# Patient Record
Sex: FEMALE | Employment: UNEMPLOYED | URBAN - METROPOLITAN AREA
[De-identification: names, ages, dates, MRNs, and addresses within clinical notes are randomized per-mention and may not be internally consistent; named-entity substitution may affect disease eponyms.]

---

## 2017-02-27 ENCOUNTER — OFFICE VISIT (OUTPATIENT)
Dept: NEUROLOGY | Age: 67
End: 2017-02-27

## 2017-02-27 VITALS
DIASTOLIC BLOOD PRESSURE: 72 MMHG | SYSTOLIC BLOOD PRESSURE: 114 MMHG | BODY MASS INDEX: 36.86 KG/M2 | HEART RATE: 98 BPM | RESPIRATION RATE: 16 BRPM | HEIGHT: 63 IN | OXYGEN SATURATION: 97 % | WEIGHT: 208 LBS | TEMPERATURE: 98 F

## 2017-02-27 DIAGNOSIS — F41.9 ANXIETY: ICD-10-CM

## 2017-02-27 DIAGNOSIS — F02.80 LATE ONSET ALZHEIMER'S DISEASE WITHOUT BEHAVIORAL DISTURBANCE (HCC): Primary | ICD-10-CM

## 2017-02-27 DIAGNOSIS — R41.840 ATTENTION DEFICIT: ICD-10-CM

## 2017-02-27 DIAGNOSIS — G30.1 LATE ONSET ALZHEIMER'S DISEASE WITHOUT BEHAVIORAL DISTURBANCE (HCC): Primary | ICD-10-CM

## 2017-02-27 DIAGNOSIS — F32.A DEPRESSION, UNSPECIFIED DEPRESSION TYPE: ICD-10-CM

## 2017-02-27 RX ORDER — DONEPEZIL HYDROCHLORIDE 5 MG/1
5 TABLET, FILM COATED ORAL DAILY
Qty: 30 TAB | Refills: 0 | Status: SHIPPED | OUTPATIENT
Start: 2017-02-27 | End: 2017-04-27 | Stop reason: ALTCHOICE

## 2017-02-27 RX ORDER — DONEPEZIL HYDROCHLORIDE 10 MG/1
10 TABLET, FILM COATED ORAL DAILY
Qty: 30 TAB | Refills: 11 | Status: SHIPPED | OUTPATIENT
Start: 2017-03-27 | End: 2018-01-02 | Stop reason: CLARIF

## 2017-02-27 NOTE — PROGRESS NOTES
[address] [facility] [address]   [place] [address]   [place], [facility] [facility] [address]    [place]   [phone] Fax               Chief Complaint   Patient presents with    Results     follow up     Current Outpatient Prescriptions   Medication Sig Dispense Refill    JANUVIA 100 mg tablet       co-enzyme Q-10 (CO Q-10) 100 mg capsule Take 200 mg by mouth daily.  DOCOSAHEXANOIC ACID/EPA (FISH OIL PO) Take 1,200 mg by mouth.  simvastatin (ZOCOR) 20 mg tablet Take  by mouth nightly.  cholecalciferol (VITAMIN D3) 1,000 unit tablet Take  by mouth daily.  NITROFURANTOIN by Does Not Apply route as needed.  CAFFEINE/PRAST, DHEA,/B6/ (PRO-ESTRO D PO) Take 75 mg by mouth.  metformin (GLUCOPHAGE) 500 mg tablet Take 500 mg by mouth two (2) times daily (with meals).  solifenacin (VESICARE) 5 mg tablet Take 5 mg by mouth daily.  multivitamin, stress formula (STRESS TAB) tablet Take 1 Tab by mouth daily.  PROGESTERONE 50 mg by Intravaginally route daily.  rosuvastatin (CRESTOR) 5 mg tablet Take 10 mg by mouth nightly. No Known Allergies  Social History   Substance Use Topics    Smoking status: Never Smoker    Smokeless tobacco: None    Alcohol use No     Mrs. Riki Ellington returns today for follow-up after her initial consultation for complaints of memory loss. MRI of the brain demonstrates age-related changes. 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 EEG, carotid Doppler, and thyroid/B12 all unrevealing. She had a formal neuropsychological evaluation and per Dr. Precious Villar report: \"This patient generated an abnormal range Neuropsychological Evaluation with respect to neurocognitive functioning.  In this regard, impairments are noted for verbal fluency, confrontation naming, sustained visual attention, verbal and nonverbal auditory attention, auditory memory, visual recognition recall, working memory capacity, and bilateral fine motor dexterity. At the same time, her verbal comprehension, perceptual reasoning, verbal comprehension, bilateral motor speed, and executive functioning remain normal. Emotionally, there is support for moderate depression and anxiety and she reports symptoms consistent with a PTSD diagnosis as well.      The pattern of normal versus abnormal neurocognitive test scores suggests that this is not a pseudodementia nor is the profile consistent with a chronic attention issue. Concern is thus for a mild to moderate form of dementia exacerbated by emotional distress. I suggest consideration for memory management medication, medication for attention, and psychiatric treatment and counseling for depression and anxiety concerns. While she is competent, I am concerned about the impact marked attention problems may have upon her driving. In that regard, I suggest consideration for a formal evaluation of driving safety. She also needs supervision for medications and finances. I would expect to see some improvement in day-to-day neurocognitive functioning pending successful mental health intervention. Underlying dementia issues will likely persist, however. This issue has been caught relatively early on, and I hope she recognizes this as positive. Baseline now established. Follow up prn. Clinical correlation is, of course, indicated. \"    Examination  Visit Vitals    Resp 16    Ht 5' 3\" (1.6 m)    Wt 94.3 kg (208 lb)    BMI 36.85 kg/m2     She looks well. She has appropriate dress and grooming. Awake alert oriented and conversant. Normal speech and language. Steady gait. Impression/Plan    Dementia, likely Alzheimer's type, with superimposed depression/anxiety/attention deficit. We discussed today her test results at length including her neuropsychological evaluation and what that means.   We discussed dementia, the treatment of such, natural history, progression, goals of therapy, medications used and there mechanism of action as well as the rationale for treatment, the rationale for dual therapy, and nonpharmacologic issues such as staying active cognitively, physically, and socially. We also discussed how depression and anxiety as well as attention if untreated can exacerbate memory complaints. At this point we will start Aricept in a customary fashion titrating to 10 mg daily. She will return in 8 weeks and we will start Namzaric. After her memory modifying medication has been in place then we will have her either see Dr. Robbie Bazan for optimization of her depression/anxiety versus psychiatry. Answered all of hers and her 's questions today. We will also send her information to our  to see if she would qualify for the ideas study regarding imaging for Alzheimer's disease. This note was created using voice recognition software. Despite editing, there may be syntax errors. This note will not be viewable in [address]       Total time: 35 min   Counseling / coordination time: 30 min   > 50% counseling / coordination?: Yes re: as documented above

## 2017-02-27 NOTE — PATIENT INSTRUCTIONS
Information Regarding Testing     If you have physican order for a test or a medication denied by your insurance company, this does not mean the test or medication is not appropriate for you as that is a medical decision, not a decision to be made by an insurance company representative or by an [facility] physician who has not interviewed and examined you. This is a decision to be made between you and your physician. The denial of services is a contractual matter between you and your insurance company, not an issue between your physician and the insurance company. If your test or medication is denied, you can take the following steps to help resolve the issue:    1. File a complaint with the [facility] regarding your insurance company's denial of services ordered for you. You can do this either by calling them directly or by completing an on-line complaint form on the [employer]. This can be found at www.Yub    2. Also file a formal complaint with your insurance company and ask to have the name of the person denying the service so that you may explore a legal option should you be harmed by this denial of service. Again, the fact the insurance company will not pay for the service does not mean it is not medically necessary and I would encourage you to follow through with the plan that was made with your physician    3. File a written complaint with your employer so your employer and benefit manager is aware of the poor coverage they are providing their employees. If you have medicare/medicaid, complain to your representative in the House and to your Roxana Gardiner.         [facility]   Statement to Patients  April 1, 2014      In an effort to ensure the large volume of patient prescription refills is processed in the most efficient and expeditious manner, we are asking our patients to assist us by calling your Pharmacy for all prescription refills, this will include also your  Mail Order Pharmacy. The pharmacy will contact our office electronically to continue the refill process. Please do not wait until the last minute to call your pharmacy. We need at least 48 hours (2days) to fill prescriptions. We also encourage you to call your pharmacy before going to  your prescription to make sure it is ready. With regard to controlled substance prescription refill requests (narcotic refills) that need to be picked up at our office, we ask your cooperation by providing us with at least 72 hours (3days) notice that you will need a refill. We will not refill narcotic prescription refill requests after 4:00pm on any weekday, Monday through Thursday, or after 2:00pm on Fridays, or on the weekends. We encourage everyone to explore another way of getting your prescription refill request processed using [employer], our patient web portal through our electronic medical record system. [employer] is an efficient and effective way to communicate your medication request directly to the office and  downloadable as an valerie on your smart phone . [employer] also features a review functionality that allows you to view your medication list as well as leave messages for your physician. Are you ready to get connected? If so please review the attatched instructions or speak to any of our staff to get you set up right away! Thank you so much for your cooperation. Should you have any questions please contact our Practice Administrator. The Physicians and Staff,  [facility]       If we have ordered testing for you, we do not call patients with results and we do not give test results over the phone. We schedule follow up appointments so that your results can be discussed in person and any questions you have regarding them may be addressed.   If something of concern is revealed on your test, we will call you for a sooner follow up appointment. Additionally, results may be found by using the My Chart feature and one of our patient service representatives at the  can give you instructions on how to access this feature of our electronic medical record system. Learning About [employer]  What is a living will? A living will is a legal form you use to write down the kind of care you want at the end of your life. It is used by the health professionals who will treat you if you aren't able to decide for yourself. If you put your wishes in writing, your loved ones and others will know what kind of care you want. They won't need to guess. This can ease your mind and be helpful to others. A living will is not the same as an estate or property will. An estate will explains what you want to happen with your money and property after you die. Is a living will a legal document? A living will is a legal document. Each state has its own laws about living qureshi. If you move to another state, make sure that your living will is legal in the state where you now live. Or you might use a universal form that has been approved by many states. This kind of form can sometimes be completed and stored online. Your electronic copy will then be available wherever you have a connection to the Internet. In most cases, doctors will respect your wishes even if you have a form from a different state. · You don't need an  to complete a living will. But legal advice can be helpful if your state's laws are unclear, your health history is complicated, or your family can't agree on what should be in your living will. · You can change your living will at any time. Some people find that their wishes about end-of-life care change as their health changes. · In addition to making a living will, think about completing a medical power of  form.  This form lets you name the person you want to make end-of-life treatment decisions for you (your \"health care agent\") if you're not able to. Many hospitals and nursing homes will give you the forms you need to complete a living will and a medical power of . · Your living will is used only if you can't make or communicate decisions for yourself anymore. If you become able to make decisions again, you can accept or refuse any treatment, no matter what you wrote in your living will. · Your state may offer an online registry. This is a place where you can store your living will online so the doctors and nurses who need to treat you can find it right away. What should you think about when creating a living will? Talk about your end-of-life wishes with your family members and your doctor. Let them know what you want. That way the people making decisions for you won't be surprised by your choices. Think about these questions as you make your living will:  · Do you know enough about life support methods that might be used? If not, talk to your doctor so you know what might be done if you can't breathe on your own, your heart stops, or you're unable to swallow. · What things would you still want to be able to do after you receive life-support methods? Would you want to be able to walk? To speak? To eat on your own? To live without the help of machines? · If you have a choice, where do you want to be cared for? In your home? At a hospital or nursing home? · Do you want certain Yarsani practices performed if you become very ill? · If you have a choice at the end of your life, where would you prefer to die? At home? In a hospital or nursing home? Somewhere else? · Would you prefer to be buried or cremated? · Do you want your organs to be donated after you die? What should you do with your living will? · Make sure that your family members and your health care agent have copies of your living will. · Give your doctor a copy of your living will to keep in your medical record.  If you have more than one doctor, make sure that each one has a copy. · You may want to put a copy of your living will where it can be easily found. Where can you learn more? Go to http://julioc-fer.info/. Enter V766 in the search box to learn more about \"Learning About Living Perroy. \"  Current as of: February 24, 2016  Content Version: 11.1  © 3297-0424 [employer]. Care instructions adapted under license by [employer] (which disclaims liability or warranty for this information). If you have questions about a medical condition or this instruction, always ask your healthcare professional. [address] any warranty or liability for your use of this information. Start Aricept 5 mg once daily for 30 days and then start Aricept 10 mg once daily thereafter  If you become a little queasy with this medicine try taking it with food. If the food does not help try taking it at night so you sleep through it.   If this happens it is transient and should get better    Return in about 8 weeks and we will start Namzaric titration

## 2017-02-27 NOTE — MR AVS SNAPSHOT
Visit Information Date & Time Provider Department Dept. Phone Encounter #  
 2/27/2017 10:40 AM Lars Cowden, MD Neurology [phone] Follow-up Instructions Return in about 8 weeks (around 4/24/2017). Upcoming Health Maintenance Date Due Hepatitis C Screening 1950 DTaP/Tdap/Td series (1 - Tdap) 8/7/1973 PAP AKA CERVICAL CYTOLOGY 8/7/1973 BREAST CANCER SCRN MAMMOGRAM 8/7/2002 FOBT Q 1 YEAR AGE 50-75 8/7/2002 ZOSTER VACCINE AGE 60> 8/7/2012 INFLUENZA AGE 9 TO ADULT 8/1/2016 Allergies as of 2/27/2017  Review Complete On: 2/27/2017 By: Lars Cowden, MD  
 No Known Allergies Current Immunizations  Never Reviewed No immunizations on file. Not reviewed this visit You Were Diagnosed With   
  
 Codes Comments Late onset Alzheimer's disease without behavioral disturbance    -  Primary ICD-10-CM: G30.1, F02.80 ICD-9-CM: 331.0, 294.10 Attention deficit     ICD-10-CM: R41.840 ICD-9-CM: 799.51 Depression, unspecified depression type     ICD-10-CM: F32.9 ICD-9-CM: 977 Anxiety     ICD-10-CM: F41.9 ICD-9-CM: 300.00 Vitals BP  
  
  
  
  
  
 114/72 Vitals History BMI and BSA Data Body Mass Index Body Surface Area  
 36.85 kg/m 2 2.05 m 2 Preferred Pharmacy Pharmacy Name Phone [facility] [address] , [address]  [phone] Your Updated Medication List  
  
   
This list is accurate as of: 2/27/17 11:14 AM.  Always use your most recent med list.  
  
  
  
  
 CO Q-10 100 mg capsule Generic drug:  co-enzyme Q-10 Take 200 mg by mouth daily. CRESTOR 5 mg tablet Generic drug:  rosuvastatin Take 10 mg by mouth nightly. * donepezil 5 mg tablet Commonly known as:  ARICEPT Take 1 Tab by mouth daily. * donepezil 10 mg tablet Commonly known as:  ARICEPT Take 1 Tab by mouth daily. Start taking on:  3/27/2017 FISH OIL PO Take 1,200 mg by mouth. JANUVIA 100 mg tablet Generic drug:  SITagliptin  
  
 metFORMIN 500 mg tablet Commonly known as:  GLUCOPHAGE Take 500 mg by mouth two (2) times daily (with meals). multivitamin, stress formula tablet Commonly known as:  STRESS TAB Take 1 Tab by mouth daily. NITROFURANTOIN  
by Does Not Apply route as needed. PRO-ESTRO D PO Take 75 mg by mouth. PROGESTERONE  
50 mg by Intravaginally route daily. simvastatin 20 mg tablet Commonly known as:  ZOCOR Take  by mouth nightly. VESIcare 5 mg tablet Generic drug:  solifenacin Take 5 mg by mouth daily. VITAMIN D3 1,000 unit tablet Generic drug:  cholecalciferol Take  by mouth daily. * Notice: This list has 2 medication(s) that are the same as other medications prescribed for you. Read the directions carefully, and ask your doctor or other care provider to review them with you. Prescriptions Sent to Pharmacy Refills  
 donepezil (ARICEPT) 5 mg tablet 0 Sig: Take 1 Tab by mouth daily. Class: Normal  
 Pharmacy: [facility], [address] Ph #: [phone] Route: Oral  
 donepezil (ARICEPT) 10 mg tablet 11 Starting on: 3/27/2017 Sig: Take 1 Tab by mouth daily. Class: Normal  
 Pharmacy: [facility], [address] Ph #: [phone] Route: Oral  
  
We Performed the Following REFERRAL TO NEUROLOGY [WYL74 Custom] Comments:  
 Please evaluate patient for SEND TO [facility] FOR IDEAS STUDY Follow-up Instructions Return in about 8 weeks (around 4/24/2017). Referral Information Referral ID Referred By Referred To [id number] [address] MD Ryland Cobb [address] [facility], [address] [address] Phone: [phone] Fax: [phone] Visits Status Start Date End Date 1 New Request 2/27/17 2/27/18 If your referral has a status of pending review or denied, additional information will be sent to support the outcome of this decision. Patient Instructions Information Regarding Testing If you have physican order for a test or a medication denied by your insurance company, this does not mean the test or medication is not appropriate for you as that is a medical decision, not a decision to be made by an insurance company representative or by an [facility] physician who has not interviewed and examined you. This is a decision to be made between you and your physician. The denial of services is a contractual matter between you and your insurance company, not an issue between your physician and the insurance company. If your test or medication is denied, you can take the following steps to help resolve the issue: 1. File a complaint with the [facility] of Insurance regarding your insurance company's denial of services ordered for you. You can do this either by calling them directly or by completing an on-line complaint form on the [employer]. This can be found at www.[place].Digital Reef 2. Also file a formal complaint with your insurance company and ask to have the name of the person denying the service so that you may explore a legal option should you be harmed by this denial of service. Again, the fact the insurance company will not pay for the service does not mean it is not medically necessary and I would encourage you to follow through with the plan that was made with your physician 3. File a written complaint with your employer so your employer and benefit manager is aware of the poor coverage they are providing their employees. If you have medicare/medicaid, complain to your representative in the House and to your Roxana Gardiner. PRESCRIPTION REFILL POLICY [facility] Statement to Patients April 1, 2014 In an effort to ensure the large volume of patient prescription refills is processed in the most efficient and expeditious manner, we are asking our patients to assist us by calling your Pharmacy for all prescription refills, this will include also your  Mail Order Pharmacy. The pharmacy will contact our office electronically to continue the refill process. Please do not wait until the last minute to call your pharmacy. We need at least 48 hours (2days) to fill prescriptions. We also encourage you to call your pharmacy before going to  your prescription to make sure it is ready. With regard to controlled substance prescription refill requests (narcotic refills) that need to be picked up at our office, we ask your cooperation by providing us with at least 72 hours (3days) notice that you will need a refill. We will not refill narcotic prescription refill requests after 4:00pm on any weekday, Monday through Thursday, or after 2:00pm on Fridays, or on the weekends. We encourage everyone to explore another way of getting your prescription refill request processed using [employer], our patient web portal through our electronic medical record system. IIIMOBI is an efficient and effective way to communicate your medication request directly to the office and  downloadable as an valerie on your smart phone . [employer] also features a review functionality that allows you to view your medication list as well as leave messages for your physician. Are you ready to get connected? If so please review the attatched instructions or speak to any of our staff to get you set up right away! Thank you so much for your cooperation. Should you have any questions please contact our Practice Administrator. The Physicians and Staff,  [facility] If we have ordered testing for you, we do not call patients with results and we do not give test results over the phone. We schedule follow up appointments so that your results can be discussed in person and any questions you have regarding them may be addressed. If something of concern is revealed on your test, we will call you for a sooner follow up appointment. Additionally, results may be found by using the My Chart feature and one of our patient service representatives at the  can give you instructions on how to access this feature of our electronic medical record system. [address] What is a living will? A living will is a legal form you use to write down the kind of care you want at the end of your life. It is used by the health professionals who will treat you if you aren't able to decide for yourself. If you put your wishes in writing, your loved ones and others will know what kind of care you want. They won't need to guess. This can ease your mind and be helpful to others. A living will is not the same as an estate or property will. An estate will explains what you want to happen with your money and property after you die. Is a living will a legal document? A living will is a legal document. Each state has its own laws about living qureshi. If you move to another state, make sure that your living will is legal in the state where you now live. Or you might use a universal form that has been approved by many states. This kind of form can sometimes be completed and stored online. Your electronic copy will then be available wherever you have a connection to the Internet. In most cases, doctors will respect your wishes even if you have a form from a different state. · You don't need an  to complete a living will.  But legal advice can be helpful if your state's laws are unclear, your health history is complicated, or your family can't agree on what should be in your living will. · You can change your living will at any time. Some people find that their wishes about end-of-life care change as their health changes. · In addition to making a living will, think about completing a medical power of  form. This form lets you name the person you want to make end-of-life treatment decisions for you (your \"health care agent\") if you're not able to. Many hospitals and nursing homes will give you the forms you need to complete a living will and a medical power of . · Your living will is used only if you can't make or communicate decisions for yourself anymore. If you become able to make decisions again, you can accept or refuse any treatment, no matter what you wrote in your living will. · Your state may offer an online registry. This is a place where you can store your living will online so the doctors and nurses who need to treat you can find it right away. What should you think about when creating a living will? Talk about your end-of-life wishes with your family members and your doctor. Let them know what you want. That way the people making decisions for you won't be surprised by your choices. Think about these questions as you make your living will: · Do you know enough about life support methods that might be used? If not, talk to your doctor so you know what might be done if you can't breathe on your own, your heart stops, or you're unable to swallow. · What things would you still want to be able to do after you receive life-support methods? Would you want to be able to walk? To speak? To eat on your own? To live without the help of machines? · If you have a choice, where do you want to be cared for? In your home? At a hospital or nursing home? · Do you want certain Rastafarian practices performed if you become very ill? · If you have a choice at the end of your life, where would you prefer to die? At home? In a hospital or nursing home? Somewhere else? · Would you prefer to be buried or cremated? · Do you want your organs to be donated after you die? What should you do with your living will? · Make sure that your family members and your health care agent have copies of your living will. · Give your doctor a copy of your living will to keep in your medical record. If you have more than one doctor, make sure that each one has a copy. · You may want to put a copy of your living will where it can be easily found. Where can you learn more? Go to http://julio c-fer.info/. Enter D370 in the search box to learn more about \"Learning About Living Perroy. \" Current as of: February 24, 2016 Content Version: 11.1 © 4884-7433 codesy. Care instructions adapted under license by [employer] (which disclaims liability or warranty for this information). If you have questions about a medical condition or this instruction, always ask your healthcare professional. [address] any warranty or liability for your use of this information. Start Aricept 5 mg once daily for 30 days and then start Aricept 10 mg once daily thereafter If you become a little queasy with this medicine try taking it with food. If the food does not help try taking it at night so you sleep through it. If this happens it is transient and should get better Return in about 8 weeks and we will start Namzaric titration Introducing [facility]! Radha Steel introduces [employer] patient portal. Now you can access parts of your medical record, email your doctor's office, and request medication refills online. 1. In your internet browser, go to [email or web address] 2. Click on the First Time User? Click Here link in the Sign In box.  You will see the New Member Sign Up page. 3. Enter your [employer] Access Code exactly as it appears below. You will not need to use this code after youve completed the sign-up process. If you do not sign up before the expiration date, you must request a new code. · [employer] Access Code: [email or web address] Expires: 3/22/2017  9:48 AM 
 
4. Enter the last four digits of your Social Security Number (xxxx) and Date of Birth (mm/dd/yyyy) as indicated and click Submit. You will be taken to the next sign-up page. 5. Create a [employer] ID. This will be your [employer] login ID and cannot be changed, so think of one that is secure and easy to remember. 6. Create a [employer] password. You can change your password at any time. 7. Enter your Password Reset Question and Answer. This can be used at a later time if you forget your password. 8. Enter your e-mail address. You will receive e-mail notification when new information is available in [address] 9. Click Sign Up. You can now view and download portions of your medical record. 10. Click the Download Summary menu link to download a portable copy of your medical information. If you have questions, please visit the Frequently Asked Questions section of the [employer] website. Remember, [employer] is NOT to be used for urgent needs. For medical emergencies, dial 911. Now available from your iPhone and Android! Please provide this summary of care documentation to your next provider. Your primary care clinician is listed as Brent Crowell. If you have any questions after today's visit, please call [phone].

## 2017-02-27 NOTE — PROGRESS NOTES
Follow up for results for memory loss. No significant changes in memory since last visit. No acute problems reported.

## 2017-03-09 ENCOUNTER — TELEPHONE (OUTPATIENT)
Dept: NEUROLOGY | Age: 67
End: 2017-03-09

## 2017-03-09 NOTE — TELEPHONE ENCOUNTER
Called and spoke to patient   She last saw dr Miranda Clay and was started on aricept she has been having loose stools and would like to know if she should stop the medication/ start a new medication?   Please advise

## 2017-03-09 NOTE — TELEPHONE ENCOUNTER
----- Message from Madison Adorno sent at 3/9/2017  4:07 PM EST -----  Regarding:  CYNDEE Ho/Telephone    Pt is waiting and requesting the \"Aricept\" Rx. It is causing severe diarrhea. Pt's best contact number [phone].

## 2017-04-27 ENCOUNTER — OFFICE VISIT (OUTPATIENT)
Dept: NEUROLOGY | Age: 67
End: 2017-04-27

## 2017-04-27 VITALS
BODY MASS INDEX: 36.86 KG/M2 | RESPIRATION RATE: 20 BRPM | WEIGHT: 208 LBS | DIASTOLIC BLOOD PRESSURE: 70 MMHG | HEIGHT: 63 IN | SYSTOLIC BLOOD PRESSURE: 110 MMHG

## 2017-04-27 DIAGNOSIS — F02.80 LATE ONSET ALZHEIMER'S DISEASE WITHOUT BEHAVIORAL DISTURBANCE (HCC): Primary | ICD-10-CM

## 2017-04-27 DIAGNOSIS — G30.1 LATE ONSET ALZHEIMER'S DISEASE WITHOUT BEHAVIORAL DISTURBANCE (HCC): Primary | ICD-10-CM

## 2017-04-27 RX ORDER — ALPRAZOLAM 0.25 MG/1
TABLET ORAL
COMMUNITY
End: 2017-10-10

## 2017-04-27 RX ORDER — NAPROXEN 500 MG/1
500 TABLET ORAL 2 TIMES DAILY WITH MEALS
COMMUNITY
End: 2018-07-03

## 2017-04-27 RX ORDER — GABAPENTIN 300 MG/1
300 CAPSULE ORAL 3 TIMES DAILY
COMMUNITY
Start: 2017-03-27

## 2017-04-27 RX ORDER — LEVOTHYROXINE SODIUM 75 UG/1
TABLET ORAL
COMMUNITY
Start: 2017-04-22

## 2017-04-27 RX ORDER — TRIAMCINOLONE ACETONIDE 55 UG/1
2 SPRAY, METERED NASAL DAILY
COMMUNITY

## 2017-04-27 NOTE — MR AVS SNAPSHOT
Visit Information Date & Time Provider Department Dept. Phone Encounter #  
 4/27/2017 10:30 AM Claudetta Cord, NP [facility] [phone] [id number] Follow-up Instructions Return in about 2 months (around 6/27/2017). Upcoming Health Maintenance Date Due Hepatitis C Screening 1950 DTaP/Tdap/Td series (1 - Tdap) 8/7/1973 PAP AKA CERVICAL CYTOLOGY 8/7/1973 BREAST CANCER SCRN MAMMOGRAM 8/7/2002 FOBT Q 1 YEAR AGE 50-75 8/7/2002 ZOSTER VACCINE AGE 60> 8/7/2012 INFLUENZA AGE 9 TO ADULT 8/1/2016 Allergies as of 4/27/2017  Review Complete On: 2/27/2017 By: Hussain Cool MD  
 No Known Allergies Current Immunizations  Never Reviewed No immunizations on file. Not reviewed this visit You Were Diagnosed With   
  
 Codes Comments Late onset Alzheimer's disease without behavioral disturbance    -  Primary ICD-10-CM: G30.1, F02.80 ICD-9-CM: 331.0, 294.10 Vitals BP Resp Height(growth percentile) Weight(growth percentile) BMI OB Status 110/70 20 5' 3\" (1.6 m) 208 lb (94.3 kg) 36.85 kg/m2 Hysterectomy Smoking Status Never Smoker BMI and BSA Data Body Mass Index Body Surface Area  
 36.85 kg/m 2 2.05 m 2 Preferred Pharmacy Pharmacy Name Phone [facility] [address], [address]  [phone] Your Updated Medication List  
  
   
This list is accurate as of: 4/27/17 11:10 AM.  Always use your most recent med list.  
  
  
  
  
 ALPRAZolam 0.25 mg tablet Commonly known as:  Earl Poke Take  by mouth. CO Q-10 100 mg capsule Generic drug:  co-enzyme Q-10 Take 200 mg by mouth daily. donepezil 10 mg tablet Commonly known as:  ARICEPT Take 1 Tab by mouth daily. FISH OIL PO Take 1,200 mg by mouth.  
  
 gabapentin 300 mg capsule Commonly known as:  NEURONTIN Take 300 mg by mouth nightly. JANUVIA 100 mg tablet Generic drug:  SITagliptin  
  
 memantine-donepezil 28-10 mg Cspx Commonly known as:  [employer] AND [facility] Take 1 Cap by mouth daily. NAPROSYN 500 mg tablet Generic drug:  naproxen Take 500 mg by mouth two (2) times daily (with meals). NASACORT AQ 55 mcg nasal inhaler Generic drug:  triamcinolone 2 Sprays daily. PRO-ESTRO D PO Take 75 mg by mouth. SYNTHROID 75 mcg tablet Generic drug:  levothyroxine VITAMIN D3 1,000 unit tablet Generic drug:  cholecalciferol Take  by mouth daily. Prescriptions Sent to Pharmacy Refills  
 memantine-donepezil (NAMZARIC) 28-10 mg CSpX 5 Sig: Take 1 Cap by mouth daily. Class: Normal  
 Pharmacy: [facility] [address] [facility] , [address] [facility] #: [phone] Route: Oral  
  
Follow-up Instructions Return in about 2 months (around 6/27/2017). Patient Instructions PRESCRIPTION REFILL POLICY [facility] Statement to Patients April 1, 2014 In an effort to ensure the large volume of patient prescription refills is processed in the most efficient and expeditious manner, we are asking our patients to assist us by calling your Pharmacy for all prescription refills, this will include also your  Mail Order Pharmacy. The pharmacy will contact our office electronically to continue the refill process. Please do not wait until the last minute to call your pharmacy. We need at least 48 hours (2days) to fill prescriptions. We also encourage you to call your pharmacy before going to  your prescription to make sure it is ready. With regard to controlled substance prescription refill requests (narcotic refills) that need to be picked up at our office, we ask your cooperation by providing us with at least 72 hours (3days) notice that you will need a refill.  
 
We will not refill narcotic prescription refill requests after 4:00pm on any weekday, Monday through Thursday, or after 2:00pm on Fridays, or on the weekends. We encourage everyone to explore another way of getting your prescription refill request processed using [employer] our patient web portal through our electronic medical record system. [place] is an efficient and effective way to communicate your medication request directly to the office and  downloadable as an valerie on your smart phone . [employer] also features a review functionality that allows you to view your medication list as well as leave messages for your physician. Are you ready to get connected? If so please review the attatched instructions or speak to any of our staff to get you set up right away! Thank you so much for your cooperation. Should you have any questions please contact our Practice Administrator. The Physicians and Staff,  [facility] [address] What is a living will? A living will is a legal form you use to write down the kind of care you want at the end of your life. It is used by the health professionals who will treat you if you aren't able to decide for yourself. If you put your wishes in writing, your loved ones and others will know what kind of care you want. They won't need to guess. This can ease your mind and be helpful to others. A living will is not the same as an estate or property will. An estate will explains what you want to happen with your money and property after you die. Is a living will a legal document? A living will is a legal document. Each state has its own laws about living qureshi. If you move to another state, make sure that your living will is legal in the state where you now live. Or you might use a universal form that has been approved by many states. This kind of form can sometimes be completed and stored online. Your electronic copy will then be available wherever you have a connection to the Internet.  In most cases, doctors will respect your wishes even if you have a form from a different state. · You don't need an  to complete a living will. But legal advice can be helpful if your state's laws are unclear, your health history is complicated, or your family can't agree on what should be in your living will. · You can change your living will at any time. Some people find that their wishes about end-of-life care change as their health changes. · In addition to making a living will, think about completing a medical power of  form. This form lets you name the person you want to make end-of-life treatment decisions for you (your \"health care agent\") if you're not able to. Many hospitals and nursing homes will give you the forms you need to complete a living will and a medical power of . · Your living will is used only if you can't make or communicate decisions for yourself anymore. If you become able to make decisions again, you can accept or refuse any treatment, no matter what you wrote in your living will. · Your state may offer an online registry. This is a place where you can store your living will online so the doctors and nurses who need to treat you can find it right away. What should you think about when creating a living will? Talk about your end-of-life wishes with your family members and your doctor. Let them know what you want. That way the people making decisions for you won't be surprised by your choices. Think about these questions as you make your living will: · Do you know enough about life support methods that might be used? If not, talk to your doctor so you know what might be done if you can't breathe on your own, your heart stops, or you're unable to swallow. · What things would you still want to be able to do after you receive life-support methods? Would you want to be able to walk? To speak? To eat on your own? To live without the help of machines? · If you have a choice, where do you want to be cared for? In your home? At a hospital or nursing home? · Do you want certain Nondenominational practices performed if you become very ill? · If you have a choice at the end of your life, where would you prefer to die? At home? In a hospital or nursing home? Somewhere else? · Would you prefer to be buried or cremated? · Do you want your organs to be donated after you die? What should you do with your living will? · Make sure that your family members and your health care agent have copies of your living will. · Give your doctor a copy of your living will to keep in your medical record. If you have more than one doctor, make sure that each one has a copy. · You may want to put a copy of your living will where it can be easily found. Where can you learn more? Go to http://julio c-fer.info/. Enter I903 in the search box to learn more about \"Learning About Living Perrosanjay. \" Current as of: February 24, 2016 Content Version: 11.2 © 1374-1595 Tablo. Care instructions adapted under license by [employer] (which disclaims liability or warranty for this information). If you have questions about a medical condition or this instruction, always ask your healthcare professional. [place] [address] any warranty or liability for your use of this information. Advance Directives: Care Instructions Your Care Instructions An advance directive is a legal way to state your wishes at the end of your life. It tells your family and your doctor what to do if you can no longer say what you want. There are two main types of advance directives. You can change them any time that your wishes change. · A living will tells your family and your doctor your wishes about life support and other treatment.  
· A durable power of  for health care lets you name a person to make treatment decisions for you when you can't speak for yourself. This person is called a health care agent. If you do not have an advance directive, decisions about your medical care may be made by a doctor or a  who doesn't know you. It may help to think of an advance directive as a gift to the people who care for you. If you have one, they won't have to make tough decisions by themselves. Follow-up care is a key part of your treatment and safety. Be sure to make and go to all appointments, and call your doctor if you are having problems. It's also a good idea to know your test results and keep a list of the medicines you take. How can you care for yourself at home? · Discuss your wishes with your loved ones and your doctor. This way, there are no surprises. · Many states have a unique form. Or you might use a universal form that has been approved by many states. This kind of form can sometimes be completed and stored online. Your electronic copy will then be available wherever you have a connection to the Internet. In most cases, doctors will respect your wishes even if you have a form from a different state. · You don't need a  to do an advance directive. But you may want to get legal advice. · Think about these questions when you prepare an advance directive: ¨ Who do you want to make decisions about your medical care if you are not able to? Many people choose a family member or close friend. ¨ Do you know enough about life support methods that might be used? If not, talk to your doctor so you understand. ¨ What are you most afraid of that might happen? You might be afraid of having pain, losing your independence, or being kept alive by machines. ¨ Where would you prefer to die? Choices include your home, a hospital, or a nursing home. ¨ Would you like to have information about hospice care to support you and your family? ¨ Do you want to donate organs when you die? ¨ Do you want certain Confucianism practices performed before you die? If so, put your wishes in the advance directive. · Read your advance directive every year, and make changes as needed. When should you call for help? Be sure to contact your doctor if you have any questions. Where can you learn more? Go to http://julio c-fer.info/. Enter R264 in the search box to learn more about \"Advance Directives: Care Instructions. \" Current as of: November 17, 2016 Content Version: 11.2 © 0749-3633 INAPPIN. Care instructions adapted under license by [employer] (which disclaims liability or warranty for this information). If you have questions about a medical condition or this instruction, always ask your healthcare professional. [address] any warranty or liability for your use of this information. Introducing [facility]! Geeta Leighann introduces [employer] patient portal. Now you can access parts of your medical record, email your doctor's office, and request medication refills online. 1. In your internet browser, go to [email or web address] 2. Click on the First Time User? Click Here link in the Sign In box. You will see the New Member Sign Up page. 3. Enter your [employer] Access Code exactly as it appears below. You will not need to use this code after youve completed the sign-up process. If you do not sign up before the expiration date, you must request a new code. · [employer] Access Code: [email or web address] Expires: 7/26/2017 11:09 AM 
 
4. Enter the last four digits of your Social Security Number (xxxx) and Date of Birth (mm/dd/yyyy) as indicated and click Submit. You will be taken to the next sign-up page. 5. Create a [employer] ID. This will be your [employer] login ID and cannot be changed, so think of one that is secure and easy to remember. 6. Create a [employer] password. You can change your password at any time. 7. Enter your Password Reset Question and Answer. This can be used at a later time if you forget your password. 8. Enter your e-mail address. You will receive e-mail notification when new information is available in [address] 9. Click Sign Up. You can now view and download portions of your medical record. 10. Click the Download Summary menu link to download a portable copy of your medical information. If you have questions, please visit the Frequently Asked Questions section of the [employer] website. Remember, [employer] is NOT to be used for urgent needs. For medical emergencies, dial 911. Now available from your iPhone and Android! Please provide this summary of care documentation to your next provider. Your primary care clinician is listed as Katia Hope. If you have any questions after today's visit, please call [phone].

## 2017-04-27 NOTE — PROGRESS NOTES
More Maldonado is a 61 y.o. female who presents with the following  Chief Complaint   Patient presents with    Memory Loss       HPI Patient comes in with  for a follow up for memory loss. Was diagnosed with dementia per Dr. Elham Hollingsworth. She was since started on Aricept 10 mg. Not noticed any changes in her memory. Still feels like things are going well. Driving, cooking, without any problems. She is staying busy by doing puzzles, reading, listening to country music. She is sleeping well. No hallucinations or delusions. She has been noticing her memory is clear. Family hx with dementia. Overall feels like things are going well. No Known Allergies    Current Outpatient Prescriptions   Medication Sig    SYNTHROID 75 mcg tablet     gabapentin (NEURONTIN) 300 mg capsule Take 300 mg by mouth nightly.  naproxen (NAPROSYN) 500 mg tablet Take 500 mg by mouth two (2) times daily (with meals).  ALPRAZolam (XANAX) 0.25 mg tablet Take  by mouth.  triamcinolone (NASACORT AQ) 55 mcg nasal inhaler 2 Sprays daily.  memantine-donepezil (NAMZARIC) 28-10 mg CSpX Take 1 Cap by mouth daily.  donepezil (ARICEPT) 10 mg tablet Take 1 Tab by mouth daily.  JANUVIA 100 mg tablet     co-enzyme Q-10 (CO Q-10) 100 mg capsule Take 200 mg by mouth daily.  DOCOSAHEXANOIC ACID/EPA (FISH OIL PO) Take 1,200 mg by mouth.  cholecalciferol (VITAMIN D3) 1,000 unit tablet Take  by mouth daily.  CAFFEINE/PRAST, DHEA,/B6/ (PRO-ESTRO D PO) Take 75 mg by mouth. No current facility-administered medications for this visit.         History   Smoking Status    Never Smoker   Smokeless Tobacco    Never Used       Past Medical History:   Diagnosis Date    Diabetes ([address]     Gastrointestinal disorder     Joint pain     Muscle pain     Neuropathy     Obesity        Past Surgical History:   Procedure Laterality Date    COLONOSCOPY      ENDOSCOPY VISIT-OUTPATIENT      HX CHOLECYSTECTOMY      HX GYN      HX HYSTERECTOMY         Family History   Problem Relation Age of Onset    Stroke Mother     Stroke Father     Dementia Other     Heart Disease Other     Neuropathy Other        Social History     Social History    Marital status:      Spouse name: N/A    Number of children: N/A    Years of education: N/A     Social History Main Topics    Smoking status: Never Smoker    Smokeless tobacco: Never Used    Alcohol use No    Drug use: None    Sexual activity: Not Asked     Other Topics Concern    None     Social History Narrative       Review of Systems   HENT: Negative for hearing loss and tinnitus. Eyes: Negative for blurred vision, double vision and photophobia. Respiratory: Negative for shortness of breath and wheezing. Cardiovascular: Negative for chest pain and palpitations. Gastrointestinal: Negative for nausea and vomiting. Neurological: Negative for weakness and headaches. Psychiatric/Behavioral: Positive for memory loss. Negative for hallucinations. The patient does not have insomnia. Remainder of comprehensive review is negative. Physical Exam :    Visit Vitals    /70    Resp 20    Ht 5' 3\" (1.6 m)    Wt 94.3 kg (208 lb)    BMI 36.85 kg/m2       General: Well defined, nourished, and groomed individual in no acute distress.    Neck: Supple, nontender, no bruits, no pain with resistance to active range of motion.    Heart: Regular rate and rhythm, no murmurs, rub, or gallop. Normal S1S2. Lungs: Clear to auscultation bilaterally with equal chest expansion, no cough, no wheeze  Musculoskeletal: Extremities revealed no edema and had full range of motion of joints.    Psych: Good mood and bright affect    NEUROLOGICAL EXAMINATION:    Mental Status: Alert and oriented to person, place, and time. MMSE 30     Cranial Nerves:    II, III, IV, VI: Visual acuity grossly intact.  Visual fields are normal.    Pupils are equal, round, and reactive to light and accommodation.    Extra-ocular movements are full and fluid. Fundoscopic exam was benign, no ptosis or nystagmus.    V-XII: Hearing is grossly intact. Facial features are symmetric, with normal sensation and strength. The palate rises symmetrically and the tongue protrudes midline. Sternocleidomastoids 5/5. Motor Examination: Normal tone, bulk, and strength, 5/5 muscle strength throughout. Coordination: Finger to nose was normal. No resting or intention tremor    Gait and Station: Steady while walking. Normal arm swing. No pronator drift. No muscle wasting or fasiculations noted. Reflexes: DTRs 2+ throughout. Results for orders placed or performed during the hospital encounter of 16   POC CREATININE   Result Value Ref Range    Creatinine (POC) 1.0 0.6 - 1.3 MG/DL    GFR-AA (POC) >60 >60 ml/min/1.73m2    GFR, non-AA (POC) 56 (L) >60 ml/min/1.73m2       Orders Placed This Encounter    SYNTHROID 75 mcg tablet    gabapentin (NEURONTIN) 300 mg capsule     Sig: Take 300 mg by mouth nightly.  naproxen (NAPROSYN) 500 mg tablet     Sig: Take 500 mg by mouth two (2) times daily (with meals).  ALPRAZolam (XANAX) 0.25 mg tablet     Sig: Take  by mouth.  triamcinolone (NASACORT AQ) 55 mcg nasal inhaler     Si Sprays daily.  memantine-donepezil (NAMZARIC) 28-10 mg CSpX     Sig: Take 1 Cap by mouth daily. Dispense:  30 Cap     Refill:  5       1. Late onset Alzheimer's disease without behavioral disturbance        Follow-up Disposition:  Return in about 2 months (around 2017). Dementia doing well with aricept. We will add Namenda Xr in the form of Namzaric up to 28-10. They understand this addition and have no questions about side effects. Continue to stay active mentally and physically. Understand the disease process and pathology. Call with any issues.        Luis Fernando Rodriguez NP        This note will not be viewable in [address]

## 2017-04-27 NOTE — PATIENT INSTRUCTIONS
[facility]   Statement to Patients  April 1, 2014      In an effort to ensure the large volume of patient prescription refills is processed in the most efficient and expeditious manner, we are asking our patients to assist us by calling your Pharmacy for all prescription refills, this will include also your  Mail Order Pharmacy. The pharmacy will contact our office electronically to continue the refill process. Please do not wait until the last minute to call your pharmacy. We need at least 48 hours (2days) to fill prescriptions. We also encourage you to call your pharmacy before going to  your prescription to make sure it is ready. With regard to controlled substance prescription refill requests (narcotic refills) that need to be picked up at our office, we ask your cooperation by providing us with at least 72 hours (3days) notice that you will need a refill. We will not refill narcotic prescription refill requests after 4:00pm on any weekday, Monday through Thursday, or after 2:00pm on Fridays, or on the weekends. We encourage everyone to explore another way of getting your prescription refill request processed using Typ[employer], our patient web portal through our electronic medical record system. Typemock is an efficient and effective way to communicate your medication request directly to the office and  downloadable as an valerie on your smart phone . Typemock also features a review functionality that allows you to view your medication list as well as leave messages for your physician. Are you ready to get connected? If so please review the attatched instructions or speak to any of our staff to get you set up right away! Thank you so much for your cooperation. Should you have any questions please contact our Practice Administrator. The Physicians and Staff,  [facility] [address] [address]  What is a living will?   A living will is a legal form you use to write down the kind of care you want at the end of your life. It is used by the health professionals who will treat you if you aren't able to decide for yourself. If you put your wishes in writing, your loved ones and others will know what kind of care you want. They won't need to guess. This can ease your mind and be helpful to others. A living will is not the same as an estate or property will. An estate will explains what you want to happen with your money and property after you die. Is a living will a legal document? A living will is a legal document. Each state has its own laws about living qureshi. If you move to another state, make sure that your living will is legal in the state where you now live. Or you might use a universal form that has been approved by many states. This kind of form can sometimes be completed and stored online. Your electronic copy will then be available wherever you have a connection to the Internet. In most cases, doctors will respect your wishes even if you have a form from a different state. · You don't need an  to complete a living will. But legal advice can be helpful if your state's laws are unclear, your health history is complicated, or your family can't agree on what should be in your living will. · You can change your living will at any time. Some people find that their wishes about end-of-life care change as their health changes. · In addition to making a living will, think about completing a medical power of  form. This form lets you name the person you want to make end-of-life treatment decisions for you (your \"health care agent\") if you're not able to. Many hospitals and nursing homes will give you the forms you need to complete a living will and a medical power of . · Your living will is used only if you can't make or communicate decisions for yourself anymore.  If you become able to make decisions again, you can accept or refuse any treatment, no matter what you wrote in your living will. · Your state may offer an online registry. This is a place where you can store your living will online so the doctors and nurses who need to treat you can find it right away. What should you think about when creating a living will? Talk about your end-of-life wishes with your family members and your doctor. Let them know what you want. That way the people making decisions for you won't be surprised by your choices. Think about these questions as you make your living will:  · Do you know enough about life support methods that might be used? If not, talk to your doctor so you know what might be done if you can't breathe on your own, your heart stops, or you're unable to swallow. · What things would you still want to be able to do after you receive life-support methods? Would you want to be able to walk? To speak? To eat on your own? To live without the help of machines? · If you have a choice, where do you want to be cared for? In your home? At a hospital or nursing home? · Do you want certain Sikhism practices performed if you become very ill? · If you have a choice at the end of your life, where would you prefer to die? At home? In a hospital or nursing home? Somewhere else? · Would you prefer to be buried or cremated? · Do you want your organs to be donated after you die? What should you do with your living will? · Make sure that your family members and your health care agent have copies of your living will. · Give your doctor a copy of your living will to keep in your medical record. If you have more than one doctor, make sure that each one has a copy. · You may want to put a copy of your living will where it can be easily found. Where can you learn more? Go to http://julio c-fer.info/. Enter N185 in the search box to learn more about \"Learning About Living Peranthony. \"  Current as of: February 24, 2016  Content Version: 11.2  © 0539-7512 [employer]. Care instructions adapted under license by [employer] (which disclaims liability or warranty for this information). If you have questions about a medical condition or this instruction, always ask your healthcare professional. [facility] [address] any warranty or liability for your use of this information. Advance Directives: Care Instructions  Your Care Instructions  An advance directive is a legal way to state your wishes at the end of your life. It tells your family and your doctor what to do if you can no longer say what you want. There are two main types of advance directives. You can change them any time that your wishes change. · A living will tells your family and your doctor your wishes about life support and other treatment. · A durable power of  for health care lets you name a person to make treatment decisions for you when you can't speak for yourself. This person is called a health care agent. If you do not have an advance directive, decisions about your medical care may be made by a doctor or a  who doesn't know you. It may help to think of an advance directive as a gift to the people who care for you. If you have one, they won't have to make tough decisions by themselves. Follow-up care is a key part of your treatment and safety. Be sure to make and go to all appointments, and call your doctor if you are having problems. It's also a good idea to know your test results and keep a list of the medicines you take. How can you care for yourself at home? · Discuss your wishes with your loved ones and your doctor. This way, there are no surprises. · Many states have a unique form. Or you might use a universal form that has been approved by many states. This kind of form can sometimes be completed and stored online.  Your electronic copy will then be available wherever you have a connection to the Internet. In most cases, doctors will respect your wishes even if you have a form from a different state. · You don't need a  to do an advance directive. But you may want to get legal advice. · Think about these questions when you prepare an advance directive:  ¨ Who do you want to make decisions about your medical care if you are not able to? Many people choose a family member or close friend. ¨ Do you know enough about life support methods that might be used? If not, talk to your doctor so you understand. ¨ What are you most afraid of that might happen? You might be afraid of having pain, losing your independence, or being kept alive by machines. ¨ Where would you prefer to die? Choices include your home, a hospital, or a nursing home. ¨ Would you like to have information about hospice care to support you and your family? ¨ Do you want to donate organs when you die? ¨ Do you want certain Quaker practices performed before you die? If so, put your wishes in the advance directive. · Read your advance directive every year, and make changes as needed. When should you call for help? Be sure to contact your doctor if you have any questions. Where can you learn more? Go to http://julio c-fer.info/. Enter R264 in the search box to learn more about \"Advance Directives: Care Instructions. \"  Current as of: November 17, 2016  Content Version: 11.2  © 7753-2096 [employer]. Care instructions adapted under license by [employer] (which disclaims liability or warranty for this information). If you have questions about a medical condition or this instruction, always ask your healthcare professional. [address] any warranty or liability for your use of this information.

## 2017-06-27 ENCOUNTER — OFFICE VISIT (OUTPATIENT)
Dept: NEUROLOGY | Age: 67
End: 2017-06-27

## 2017-06-27 ENCOUNTER — TELEPHONE (OUTPATIENT)
Dept: NEUROLOGY | Age: 67
End: 2017-06-27

## 2017-06-27 VITALS
OXYGEN SATURATION: 98 % | BODY MASS INDEX: 36.32 KG/M2 | HEIGHT: 63 IN | SYSTOLIC BLOOD PRESSURE: 118 MMHG | WEIGHT: 205 LBS | RESPIRATION RATE: 15 BRPM | HEART RATE: 76 BPM | DIASTOLIC BLOOD PRESSURE: 80 MMHG | TEMPERATURE: 98 F

## 2017-06-27 DIAGNOSIS — F02.80 LATE ONSET ALZHEIMER'S DISEASE WITHOUT BEHAVIORAL DISTURBANCE (HCC): Primary | ICD-10-CM

## 2017-06-27 DIAGNOSIS — G30.1 LATE ONSET ALZHEIMER'S DISEASE WITHOUT BEHAVIORAL DISTURBANCE (HCC): Primary | ICD-10-CM

## 2017-06-27 RX ORDER — DONEPEZIL HYDROCHLORIDE 10 MG/1
10 TABLET, FILM COATED ORAL
Qty: 30 TAB | Refills: 0 | Status: SHIPPED | OUTPATIENT
Start: 2017-06-27 | End: 2017-10-10

## 2017-06-27 RX ORDER — MEMANTINE HYDROCHLORIDE 28 MG/1
28 CAPSULE, EXTENDED RELEASE ORAL DAILY
Qty: 30 CAP | Refills: 11 | Status: SHIPPED | OUTPATIENT
Start: 2017-06-27 | End: 2017-07-26 | Stop reason: ALTCHOICE

## 2017-06-27 NOTE — PROGRESS NOTES
Follow up for memory loss. No significant changes in memory since last visit. Spouse reports patient becomes easily frustrated.

## 2017-06-27 NOTE — MR AVS SNAPSHOT
Visit Information Date & Time Provider Department Dept. Phone Encounter #  
 6/27/2017 10:40 AM Malik Tavarez MD [facility] [phone] [id number] Follow-up Instructions Return in about 6 months (around 12/27/2017). Upcoming Health Maintenance Date Due Hepatitis C Screening 1950 DTaP/Tdap/Td series (1 - Tdap) 8/7/1973 PAP AKA CERVICAL CYTOLOGY 8/7/1973 BREAST CANCER SCRN MAMMOGRAM 8/7/2002 FOBT Q 1 YEAR AGE 50-75 8/7/2002 ZOSTER VACCINE AGE 60> 8/7/2012 INFLUENZA AGE 9 TO ADULT 8/1/2017 Allergies as of 6/27/2017  Review Complete On: 6/27/2017 By: Kenn Enriquez LPN No Known Allergies Current Immunizations  Never Reviewed No immunizations on file. Not reviewed this visit Vitals BP Pulse Temp Resp Height(growth percentile) Weight(growth percentile) 118/80 76 98 °F (36.7 °C) 15 5' 3\" (1.6 m) 205 lb (93 kg) SpO2 BMI OB Status Smoking Status 98% 36.31 kg/m2 Hysterectomy Never Smoker Vitals History BMI and BSA Data Body Mass Index Body Surface Area  
 36.31 kg/m 2 2.03 m 2 Preferred Pharmacy Pharmacy Name Phone [facility] [address] , [address]  [phone] Your Updated Medication List  
  
   
This list is accurate as of: 6/27/17 11:28 AM.  Always use your most recent med list.  
  
  
  
  
 ALPRAZolam 0.25 mg tablet Commonly known as:  Alexandria Jerez Take  by mouth. CO Q-10 100 mg capsule Generic drug:  co-enzyme Q-10 Take 200 mg by mouth daily. * donepezil 10 mg tablet Commonly known as:  ARICEPT Take 1 Tab by mouth daily. * donepezil 10 mg tablet Commonly known as:  ARICEPT Take 1 Tab by mouth nightly. FISH OIL PO Take 1,200 mg by mouth.  
  
 gabapentin 300 mg capsule Commonly known as:  NEURONTIN Take 300 mg by mouth nightly. JANUVIA 100 mg tablet Generic drug:  SITagliptin  
  
 memantine ER 28 mg capsule Commonly known as:  NAMENDA XR Take 1 Cap by mouth daily. * memantine-donepezil 28-10 mg Cspx Commonly known as:  MOTION [facility] Take 1 Cap by mouth daily. * memantine-donepezil 7/14/21/28 mg-10 mg C24k Commonly known as:  MOTION [facility] Take 1 Cap by mouth daily. NAPROSYN 500 mg tablet Generic drug:  naproxen Take 500 mg by mouth two (2) times daily (with meals). NASACORT AQ 55 mcg nasal inhaler Generic drug:  triamcinolone 2 Sprays daily. PRO-ESTRO D PO Take 75 mg by mouth. SYNTHROID 75 mcg tablet Generic drug:  levothyroxine VITAMIN D3 1,000 unit tablet Generic drug:  cholecalciferol Take  by mouth daily. * Notice: This list has 4 medication(s) that are the same as other medications prescribed for you. Read the directions carefully, and ask your doctor or other care provider to review them with you. Prescriptions Sent to Pharmacy Refills  
 donepezil (ARICEPT) 10 mg tablet 0 Sig: Take 1 Tab by mouth nightly. Class: Normal  
 Pharmacy: [facility] [address] [facility], [address] Ph #: [phone] Route: Oral  
 memantine ER (NAMENDA XR) 28 mg capsule 11 Sig: Take 1 Cap by mouth daily. Class: Normal  
 Pharmacy: [facility] [address] [facility], [address] Ph #: [phone] Route: Oral  
  
Follow-up Instructions Return in about 6 months (around 12/27/2017). Patient Instructions Information Regarding Testing If you have physican order for a test or a medication denied by your insurance company, this does not mean the test or medication is not appropriate for you as that is a medical decision, not a decision to be made by an insurance company representative or by an [facility] physician who has not interviewed and examined you. This is a decision to be made between you and your physician. The denial of services is a contractual matter between you and your insurance company, not an issue between your physician and the insurance company. If your test or medication is denied, you can take the following steps to help resolve the issue: 1. File a complaint with the [facility] of Insurance regarding your insurance company's denial of services ordered for you. You can do this either by calling them directly or by completing an on-line complaint form on the [employer]. This can be found at [email or web address] 2. Also file a formal complaint with your insurance company and ask to have the name of the person denying the service so that you may explore a legal option should you be harmed by this denial of service. Again, the fact the insurance company will not pay for the service does not mean it is not medically necessary and I would encourage you to follow through with the plan that was made with your physician 3. File a written complaint with your employer so your employer and benefit manager is aware of the poor coverage they are providing their employees. If you have medicare/medicaid, complain to your representative in the House and to your Roxana Gardiner. PRESCRIPTION REFILL POLICY [address] [facility] Statement to Patients April 1, 2014 In an effort to ensure the large volume of patient prescription refills is processed in the most efficient and expeditious manner, we are asking our patients to assist us by calling your Pharmacy for all prescription refills, this will include also your  Mail Order Pharmacy. The pharmacy will contact our office electronically to continue the refill process. Please do not wait until the last minute to call your pharmacy. We need at least 48 hours (2days) to fill prescriptions. We also encourage you to call your pharmacy before going to  your prescription to make sure it is ready. With regard to controlled substance prescription refill requests (narcotic refills) that need to be picked up at our office, we ask your cooperation by providing us with at least 72 hours (3days) notice that you will need a refill. We will not refill narcotic prescription refill requests after 4:00pm on any weekday, Monday through Thursday, or after 2:00pm on Fridays, or on the weekends. We encourage everyone to explore another way of getting your prescription refill request processed using [employer], our patient web portal through our electronic medical record system. [employer] is an efficient and effective way to communicate your medication request directly to the office and  downloadable as an valerie on your smart phone . [employer] also features a review functionality that allows you to view your medication list as well as leave messages for your physician. Are you ready to get connected? If so please review the attatched instructions or speak to any of our staff to get you set up right away! Thank you so much for your cooperation. Should you have any questions please contact our Practice Administrator. The Physicians and Staff,  [facility] If we have ordered testing for you, we do not call patients with results and we do not give test results over the phone. We schedule follow up appointments so that your results can be discussed in person and any questions you have regarding them may be addressed. If something of concern is revealed on your test, we will call you for a sooner follow up appointment. Additionally, results may be found by using the My Chart feature and one of our patient service representatives at the  can give you instructions on how to access this feature of our electronic medical record system. [address] What is a living will?  
A living will is a legal form you use to write down the kind of care you want at the end of your life. It is used by the health professionals who will treat you if you aren't able to decide for yourself. If you put your wishes in writing, your loved ones and others will know what kind of care you want. They won't need to guess. This can ease your mind and be helpful to others. A living will is not the same as an estate or property will. An estate will explains what you want to happen with your money and property after you die. Is a living will a legal document? A living will is a legal document. Each state has its own laws about living qureshi. If you move to another state, make sure that your living will is legal in the state where you now live. Or you might use a universal form that has been approved by many states. This kind of form can sometimes be completed and stored online. Your electronic copy will then be available wherever you have a connection to the Internet. In most cases, doctors will respect your wishes even if you have a form from a different state. · You don't need an  to complete a living will. But legal advice can be helpful if your state's laws are unclear, your health history is complicated, or your family can't agree on what should be in your living will. · You can change your living will at any time. Some people find that their wishes about end-of-life care change as their health changes. · In addition to making a living will, think about completing a medical power of  form. This form lets you name the person you want to make end-of-life treatment decisions for you (your \"health care agent\") if you're not able to. Many hospitals and nursing homes will give you the forms you need to complete a living will and a medical power of . · Your living will is used only if you can't make or communicate decisions for yourself anymore.  If you become able to make decisions again, you can accept or refuse any treatment, no matter what you wrote in your living will. · Your state may offer an online registry. This is a place where you can store your living will online so the doctors and nurses who need to treat you can find it right away. What should you think about when creating a living will? Talk about your end-of-life wishes with your family members and your doctor. Let them know what you want. That way the people making decisions for you won't be surprised by your choices. Think about these questions as you make your living will: · Do you know enough about life support methods that might be used? If not, talk to your doctor so you know what might be done if you can't breathe on your own, your heart stops, or you're unable to swallow. · What things would you still want to be able to do after you receive life-support methods? Would you want to be able to walk? To speak? To eat on your own? To live without the help of machines? · If you have a choice, where do you want to be cared for? In your home? At a hospital or nursing home? · Do you want certain Baptist practices performed if you become very ill? · If you have a choice at the end of your life, where would you prefer to die? At home? In a hospital or nursing home? Somewhere else? · Would you prefer to be buried or cremated? · Do you want your organs to be donated after you die? What should you do with your living will? · Make sure that your family members and your health care agent have copies of your living will. · Give your doctor a copy of your living will to keep in your medical record. If you have more than one doctor, make sure that each one has a copy. · You may want to put a copy of your living will where it can be easily found. Where can you learn more? Go to http://julio c-fer.info/. Enter E116 in the search box to learn more about \"Learning About Living Tuyet. \" Current as of: August 8, 2016 Content Version: 11.3 © 0883-6323 [employer]. Care instructions adapted under license by [employer] (which disclaims liability or warranty for this information). If you have questions about a medical condition or this instruction, always ask your healthcare professional. [address] any warranty or liability for your use of this information. Introducing [facility]! González Leslie introduces Bell[employer] patient portal. Now you can access parts of your medical record, email your doctor's office, and request medication refills online. 1. In your internet browser, go to [email or web address]. [email or web address] [email or web address] care [employer] 2. Click on the First Time User? Click Here link in the Sign In box. You will see the New Member Sign Up page. 3. Enter your Bellabox Access Code exactly as it appears below. You will not need to use this code after youve completed the sign-up process. If you do not sign up before the expiration date, you must request a new code. · Bell[employer] Access Code: [email or web address] Expires: 7/26/2017 11:09 AM 
 
4. Enter the last four digits of your Social Security Number (xxxx) and Date of Birth (mm/dd/yyyy) as indicated and click Submit. You will be taken to the next sign-up page. 5. Create a Bellabox ID. This will be your Bellabox login ID and cannot be changed, so think of one that is secure and easy to remember. 6. Create a Bellabox password. You can change your password at any time. 7. Enter your Password Reset Question and Answer. This can be used at a later time if you forget your password. 8. Enter your e-mail address. You will receive e-mail notification when new information is available in [address] 9. Click Sign Up. You can now view and download portions of your medical record. 10. Click the Download Summary menu link to download a portable copy of your medical information. If you have questions, please visit the Frequently Asked Questions section of the CareToSavet website. Remember, [employer] is NOT to be used for urgent needs. For medical emergencies, dial 911. Now available from your iPhone and Android! Please provide this summary of care documentation to your next provider. Your primary care clinician is listed as Oval [place]. If you have any questions after today's visit, please call [phone].

## 2017-06-27 NOTE — TELEPHONE ENCOUNTER
----- Message from Pratibha Blakely sent at 6/27/2017  1:53 PM EDT -----  Regarding: /Refill  Pt is requesting a callback in regards to medication that is to much and needs to be changed to the generic brand of \"Namenda\" Rx.  Best contact [phone]

## 2017-06-27 NOTE — PATIENT INSTRUCTIONS
Information Regarding Testing     If you have physican order for a test or a medication denied by your insurance company, this does not mean the test or medication is not appropriate for you as that is a medical decision, not a decision to be made by an insurance company representative or by an [facility] physician who has not interviewed and examined you. This is a decision to be made between you and your physician. The denial of services is a contractual matter between you and your insurance company, not an issue between your physician and the insurance company. If your test or medication is denied, you can take the following steps to help resolve the issue:    1. File a complaint with the [facility] regarding your insurance company's denial of services ordered for you. You can do this either by calling them directly or by completing an on-line complaint form on the [employer]. This can be found at www.[employer]    2. Also file a formal complaint with your insurance company and ask to have the name of the person denying the service so that you may explore a legal option should you be harmed by this denial of service. Again, the fact the insurance company will not pay for the service does not mean it is not medically necessary and I would encourage you to follow through with the plan that was made with your physician    3. File a written complaint with your employer so your employer and benefit manager is aware of the poor coverage they are providing their employees. If you have medicare/medicaid, complain to your representative in the House and to your Rxoana Gardiner.     [facility]   Statement to Patients  April 1, 2014      In an effort to ensure the large volume of patient prescription refills is processed in the most efficient and expeditious manner, we are asking our patients to assist us by calling your Pharmacy for all prescription refills, this will include also your  Mail Order Pharmacy. The pharmacy will contact our office electronically to continue the refill process. Please do not wait until the last minute to call your pharmacy. We need at least 48 hours (2days) to fill prescriptions. We also encourage you to call your pharmacy before going to  your prescription to make sure it is ready. With regard to controlled substance prescription refill requests (narcotic refills) that need to be picked up at our office, we ask your cooperation by providing us with at least 72 hours (3days) notice that you will need a refill. We will not refill narcotic prescription refill requests after 4:00pm on any weekday, Monday through Thursday, or after 2:00pm on Fridays, or on the weekends. We encourage everyone to explore another way of getting your prescription refill request processed using Morizon, our patient web portal through our electronic medical record system. Morizon is an efficient and effective way to communicate your medication request directly to the office and  downloadable as an valerie on your smart phone . Morizon also features a review functionality that allows you to view your medication list as well as leave messages for your physician. Are you ready to get connected? If so please review the attatched instructions or speak to any of our staff to get you set up right away! Thank you so much for your cooperation. Should you have any questions please contact our Practice Administrator. The Physicians and Staff,  [facility]     If we have ordered testing for you, we do not call patients with results and we do not give test results over the phone. We schedule follow up appointments so that your results can be discussed in person and any questions you have regarding them may be addressed.   If something of concern is revealed on your test, we will call you for a sooner follow up appointment. Additionally, results may be found by using the My Chart feature and one of our patient service representatives at the  can give you instructions on how to access this feature of our electronic medical record system. Learning About Living Tuyet  What is a living will? A living will is a legal form you use to write down the kind of care you want at the end of your life. It is used by the health professionals who will treat you if you aren't able to decide for yourself. If you put your wishes in writing, your loved ones and others will know what kind of care you want. They won't need to guess. This can ease your mind and be helpful to others. A living will is not the same as an estate or property will. An estate will explains what you want to happen with your money and property after you die. Is a living will a legal document? A living will is a legal document. Each state has its own laws about living qureshi. If you move to another state, make sure that your living will is legal in the state where you now live. Or you might use a universal form that has been approved by many states. This kind of form can sometimes be completed and stored online. Your electronic copy will then be available wherever you have a connection to the Internet. In most cases, doctors will respect your wishes even if you have a form from a different state. · You don't need an  to complete a living will. But legal advice can be helpful if your state's laws are unclear, your health history is complicated, or your family can't agree on what should be in your living will. · You can change your living will at any time. Some people find that their wishes about end-of-life care change as their health changes. · In addition to making a living will, think about completing a medical power of  form.  This form lets you name the person you want to make end-of-life treatment decisions for you (your \"health care agent\") if you're not able to. Many hospitals and nursing homes will give you the forms you need to complete a living will and a medical power of . · Your living will is used only if you can't make or communicate decisions for yourself anymore. If you become able to make decisions again, you can accept or refuse any treatment, no matter what you wrote in your living will. · Your state may offer an online registry. This is a place where you can store your living will online so the doctors and nurses who need to treat you can find it right away. What should you think about when creating a living will? Talk about your end-of-life wishes with your family members and your doctor. Let them know what you want. That way the people making decisions for you won't be surprised by your choices. Think about these questions as you make your living will:  · Do you know enough about life support methods that might be used? If not, talk to your doctor so you know what might be done if you can't breathe on your own, your heart stops, or you're unable to swallow. · What things would you still want to be able to do after you receive life-support methods? Would you want to be able to walk? To speak? To eat on your own? To live without the help of machines? · If you have a choice, where do you want to be cared for? In your home? At a hospital or nursing home? · Do you want certain Buddhist practices performed if you become very ill? · If you have a choice at the end of your life, where would you prefer to die? At home? In a hospital or nursing home? Somewhere else? · Would you prefer to be buried or cremated? · Do you want your organs to be donated after you die? What should you do with your living will? · Make sure that your family members and your health care agent have copies of your living will. · Give your doctor a copy of your living will to keep in your medical record.  If you have more than one doctor, make sure that each one has a copy. · You may want to put a copy of your living will where it can be easily found. Where can you learn more? Go to http://julio c-fer.info/. Enter I092 in the search box to learn more about \"Learning About Living [place]roy. \"  Current as of: August 8, 2016  Content Version: 11.3  © 5273-3723 [employer]. Care instructions adapted under license by [employer] (which disclaims liability or warranty for this information). If you have questions about a medical condition or this instruction, always ask your healthcare professional. [address] any warranty or liability for your use of this information.

## 2017-06-28 NOTE — TELEPHONE ENCOUNTER
Spoke to patient and spouse regarding her namenda XR. Reports copay for  namenda is $125. Spoke to [employer] who applied a co pay card to patients co pay. Stated patient can obtain a co pay card for future refills. She did not know what the cost would be.

## 2017-06-30 ENCOUNTER — TELEPHONE (OUTPATIENT)
Dept: NEUROLOGY | Age: 67
End: 2017-06-30

## 2017-06-30 NOTE — PROGRESS NOTES
[address] [facility]   [place] [address]   [address] 59    [facility]   [phone] Fax               Chief Complaint   Patient presents with    Memory Loss     follow up     Current Outpatient Prescriptions   Medication Sig Dispense Refill    donepezil (ARICEPT) 10 mg tablet Take 1 Tab by mouth nightly. 30 Tab 0    memantine ER (NAMENDA XR) 28 mg capsule Take 1 Cap by mouth daily. 30 Cap 11    SYNTHROID 75 mcg tablet       gabapentin (NEURONTIN) 300 mg capsule Take 300 mg by mouth nightly.  naproxen (NAPROSYN) 500 mg tablet Take 500 mg by mouth two (2) times daily (with meals).  ALPRAZolam (XANAX) 0.25 mg tablet Take  by mouth.  triamcinolone (NASACORT AQ) 55 mcg nasal inhaler 2 Sprays daily.  memantine-donepezil (NAMZARIC) 28-10 mg CSpX Take 1 Cap by mouth daily. 30 Cap 5    JANUVIA 100 mg tablet       co-enzyme Q-10 (CO Q-10) 100 mg capsule Take 200 mg by mouth daily.  DOCOSAHEXANOIC ACID/EPA (FISH OIL PO) Take 1,200 mg by mouth.  cholecalciferol (VITAMIN D3) 1,000 unit tablet Take  by mouth daily.  CAFFEINE/PRAST, DHEA,/B6/ (PRO-ESTRO D PO) Take 75 mg by mouth.  memantine-donepezil [facility]) 7/14/21/28 mg-10 mg C24k Take 1 Cap by mouth daily. 28 Cap 0    donepezil (ARICEPT) 10 mg tablet Take 1 Tab by mouth daily. 30 Tab 11      No Known Allergies  Social History   Substance Use Topics    Smoking status: Never Smoker    Smokeless tobacco: Never Used    Alcohol use No     Patient returns today for follow-up dementia, Alzheimer's type. Returns with her . They both report no significant change in her memory since last visit. Some frustration at times. Tolerating medicines. No dangerous behavior. Continues to drive and there have been no accidents or near accidents.  comfortable with her driving. Continues to stay busy and active. Continues to keep her mind active.   She has not had any recent illness. Examination  Visit Vitals    /80    Pulse 76    Temp 98 °F (36.7 °C)    Resp 15    Ht 5' 3\" (1.6 m)    Wt 93 kg (205 lb)    SpO2 98%    BMI 36.31 kg/m2     She looks well. No icterus. Appropriate dress and appropriate grooming. Answers all orientation questions properly. Fluent. No motor focality. Steady gait. Impression/Plan  Dementia likely Alzheimer's type. Continue current regimen. Continue to stay active in all spheres. Answered questions today regarding dementia, progression, supplements, medications, goals of treatment, etc.  Follow-up in 6 months. Total time: 25 min   Counseling / coordination time: 15 min   > 50% counseling / coordination?: Yes re: as documented above        This note was created using voice recognition software. Despite editing, there may be syntax errors. This note will not be viewable in [address]

## 2017-06-30 NOTE — TELEPHONE ENCOUNTER
----- Message from Elizabeth Ellsworth sent at 6/30/2017 11:09 AM EDT -----  Regarding: Dr. Bolivar Umaña telephone   Pt is returning a call back, reason not disclosed. Best contact number [phone].

## 2017-07-25 ENCOUNTER — TELEPHONE (OUTPATIENT)
Dept: NEUROLOGY | Age: 67
End: 2017-07-25

## 2017-07-25 NOTE — TELEPHONE ENCOUNTER
----- Message from Dustin Darnell sent at 7/25/2017 12:03 PM EDT -----  Regarding: Dr Dylan Xavier  Pt's   Harshad DEAN,[phone], said they are interested in enrolling in a [facility] and he would like to confirm the  Following medication she is taking  : \"Memantine\" that is the generic for Namenda  XR \"28 mg\" he would also like to know if she can take it 2x a day instead of extended release version. If all is correct and possible please fax a rx into [facility] service pharmacy (f) [phone]/[place] you can call (p) [phone]. Please call to confirm before the order is called in.

## 2017-07-26 ENCOUNTER — TELEPHONE (OUTPATIENT)
Dept: NEUROLOGY | Age: 67
End: 2017-07-26

## 2017-07-26 RX ORDER — MEMANTINE HYDROCHLORIDE 10 MG/1
TABLET ORAL
Qty: 180 TAB | Refills: 1 | Status: SHIPPED | OUTPATIENT
Start: 2017-07-26 | End: 2018-01-02 | Stop reason: CLARIF

## 2017-07-26 NOTE — TELEPHONE ENCOUNTER
----- Message from Edith Baldwin sent at 7/26/2017 11:25 AM EDT -----  Regarding: /telephone  Belkis Jose Martin would like a return phone call from nurse to confirm which medication and dosage the pt should be taking. Mr. Lisette Cerna can be reached at [phone].

## 2017-07-26 NOTE — TELEPHONE ENCOUNTER
Message left for spouse with providers reply.  Order for Namenda 10 mg BID escribed to [facility] per request.

## 2017-07-28 NOTE — TELEPHONE ENCOUNTER
Spoke to spouse and informed him of refill for 10 mg namenda escribed to [facility] per his order. Spouse stated there is a $100 difference from IR and XR. Asked if he could break pills to reach 28 mg with the 10 mg tabs. Advised spouse not to break pills. Take as directed for the namenda 10 mg BID. Spouse wants to reach the 28 mg. Asked if patient could return to the 28 mg namenda with the next refill. Informed spouse to let office know his decision.

## 2017-10-06 ENCOUNTER — TELEPHONE (OUTPATIENT)
Dept: NEUROLOGY | Age: 67
End: 2017-10-06

## 2017-10-06 NOTE — TELEPHONE ENCOUNTER
----- Message from Warby Parker sent at 10/6/2017  9:53 AM EDT -----  Regarding: /Telephone  Pt stated that she is experience side effects from the medication she was prescribed. She is experiencing dizziness, pain in back and legs, fatigue, rash on face and feeling that she is going to faint while walking or sitting, which requires her to hold on to something.  Best contact number is [phone]

## 2017-10-09 ENCOUNTER — TELEPHONE (OUTPATIENT)
Dept: NEUROLOGY | Age: 67
End: 2017-10-09

## 2017-10-09 NOTE — TELEPHONE ENCOUNTER
----- Message from Levy Camacho sent at 10/9/2017  9:00 AM EDT -----  Regarding: Dr. Fidel Koroma  Pt stated she has side effects from her medication('Memantine Donepezil\") dizziness, pain and body aches throughout her body, and has seen her PCP, and was advised to contact Dr. Jean Claude Quintanilla, to advised what she should do. Pt stated this is her 3rd call ,and also left a message on Friday, and has received no call  and requested a call back today, or she may have to find a different doctor. Best contact number [phone].

## 2017-10-09 NOTE — TELEPHONE ENCOUNTER
Called and spoke to patient she states she has had dizziness and severe rash pain throughout body  Started taking one month ago  Made follow up for tomorrow with np

## 2017-10-10 ENCOUNTER — OFFICE VISIT (OUTPATIENT)
Dept: NEUROLOGY | Age: 67
End: 2017-10-10

## 2017-10-10 VITALS
HEIGHT: 63 IN | SYSTOLIC BLOOD PRESSURE: 120 MMHG | DIASTOLIC BLOOD PRESSURE: 72 MMHG | WEIGHT: 210 LBS | BODY MASS INDEX: 37.21 KG/M2

## 2017-10-10 DIAGNOSIS — R21 RASH: ICD-10-CM

## 2017-10-10 DIAGNOSIS — F02.80 LATE ONSET ALZHEIMER'S DISEASE WITHOUT BEHAVIORAL DISTURBANCE (HCC): Primary | ICD-10-CM

## 2017-10-10 DIAGNOSIS — G30.1 LATE ONSET ALZHEIMER'S DISEASE WITHOUT BEHAVIORAL DISTURBANCE (HCC): Primary | ICD-10-CM

## 2017-10-10 NOTE — PROGRESS NOTES
Patient states her pain jumps around depending on what she is doing. Patient also gets dizzy when going up and down steps and if she stands up too fast. Last week patient states she had a rash on her face along with sores and her skin was peeling. The rash lasted at least 7 days. Pt went to PCP because she could not get through to talk to us. He looked over all her symptoms and the medications. PCP thinks it is a reaction to [employer]. PCP took her off of Pro estro D temporarily on October 6 to see if that was causing the rash.

## 2017-10-10 NOTE — MR AVS SNAPSHOT
Visit Information Date & Time Provider Department Dept. Phone Encounter #  
 10/10/2017  9:30 AM Marcelo Lee NP [facility] [phone] [id number] Follow-up Instructions Return in about 3 months (around 1/10/2018). Your Appointments 1/2/2018 10:40 AM  
Follow Up with Ping Navarrete MD  
[address] [facility] [address]) Appt Note: memory loss rgaham  
 [address] [address] [address] [address]  
[place] [address] [address] [address] [address] Upcoming Health Maintenance Date Due Hepatitis C Screening 1950 DTaP/Tdap/Td series (1 - Tdap) 8/7/1973 BREAST CANCER SCRN MAMMOGRAM 8/7/2002 FOBT Q 1 YEAR AGE 50-75 8/7/2002 ZOSTER VACCINE AGE 60> 6/7/2012 INFLUENZA AGE 9 TO ADULT 8/1/2017 GLAUCOMA SCREENING Q2Y 8/7/2017 OSTEOPOROSIS SCREENING (DEXA) 8/7/2017 Pneumococcal 65+ Low/Medium Risk (1 of 2 - PCV13) 8/7/2017 MEDICARE YEARLY EXAM 8/7/2017 Allergies as of 10/10/2017  Review Complete On: 10/10/2017 By: Marcelo Lee NP No Known Allergies Current Immunizations  Never Reviewed No immunizations on file. Not reviewed this visit You Were Diagnosed With   
  
 Codes Comments Late onset Alzheimer's disease without behavioral disturbance    -  Primary ICD-10-CM: G30.1, F02.80 ICD-9-CM: 331.0, 294.10 Vitals BP Height(growth percentile) Weight(growth percentile) BMI OB Status Smoking Status 120/72 5' 3\" (1.6 m) 210 lb (95.3 kg) 37.2 kg/m2 Hysterectomy Never Smoker Vitals History BMI and BSA Data Body Mass Index Body Surface Area  
 37.2 kg/m 2 2.06 m 2 Preferred Pharmacy Pharmacy Name Phone  [address] [phone] Your Updated Medication List  
  
   
This list is accurate as of: 10/10/17 10:38 AM.  Always use your most recent med list.  
  
 CO Q-10 100 mg capsule Generic drug:  co-enzyme Q-10 Take 200 mg by mouth daily. donepezil 10 mg tablet Commonly known as:  ARICEPT Take 1 Tab by mouth daily. FISH OIL PO Take 1,200 mg by mouth.  
  
 gabapentin 300 mg capsule Commonly known as:  NEURONTIN Take 300 mg by mouth nightly. JANUVIA 100 mg tablet Generic drug:  SITagliptin  
  
 memantine 10 mg tablet Commonly known as:  Ollen Geri Take 1 tab PO BID. memantine-donepezil 28-10 mg Cspx Commonly known as:  MOTION PICTURE [facility] Take 1 Cap by mouth daily. NAPROSYN 500 mg tablet Generic drug:  naproxen Take 500 mg by mouth two (2) times daily (with meals). NASACORT AQ 55 mcg nasal inhaler Generic drug:  triamcinolone 2 Sprays daily. SYNTHROID 75 mcg tablet Generic drug:  levothyroxine VITAMIN D3 1,000 unit tablet Generic drug:  cholecalciferol Take  by mouth daily. Prescriptions Sent to Pharmacy Refills  
 memantine-donepezil (NAMZARIC) 28-10 mg CSpX 1 Sig: Take 1 Cap by mouth daily. Class: Normal  
 Pharmacy: [place] [address] [address] Ph #: [phone] Route: Oral  
  
Follow-up Instructions Return in about 3 months (around 1/10/2018). Patient Instructions PRESCRIPTION REFILL POLICY [facility] Statement to Patients April 1, 2014 In an effort to ensure the large volume of patient prescription refills is processed in the most efficient and expeditious manner, we are asking our patients to assist us by calling your Pharmacy for all prescription refills, this will include also your  Mail Order Pharmacy. The pharmacy will contact our office electronically to continue the refill process. Please do not wait until the last minute to call your pharmacy. We need at least 48 hours (2days) to fill prescriptions.  We also encourage you to call your pharmacy before going to  your prescription to make sure it is ready. With regard to controlled substance prescription refill requests (narcotic refills) that need to be picked up at our office, we ask your cooperation by providing us with at least 72 hours (3days) notice that you will need a refill. We will not refill narcotic prescription refill requests after 4:00pm on any weekday, Monday through Thursday, or after 2:00pm on Fridays, or on the weekends. We encourage everyone to explore another way of getting your prescription refill request processed using [employer], our patient web portal through our electronic medical record system. [employer] is an efficient and effective way to communicate your medication request directly to the office and  downloadable as an valerie on your smart phone . [employer] also features a review functionality that allows you to view your medication list as well as leave messages for your physician. Are you ready to get connected? If so please review the attatched instructions or speak to any of our staff to get you set up right away! Thank you so much for your cooperation. Should you have any questions please contact our Practice Administrator. The Physicians and Staff,  [facility] [address] What is a living will? A living will is a legal form you use to write down the kind of care you want at the end of your life. It is used by the health professionals who will treat you if you aren't able to decide for yourself. If you put your wishes in writing, your loved ones and others will know what kind of care you want. They won't need to guess. This can ease your mind and be helpful to others. A living will is not the same as an estate or property will. An estate will explains what you want to happen with your money and property after you die. Is a living will a legal document? A living will is a legal document. Each state has its own laws about living qureshi. If you move to another state, make sure that your living will is legal in the state where you now live. Or you might use a universal form that has been approved by many states. This kind of form can sometimes be completed and stored online. Your electronic copy will then be available wherever you have a connection to the Internet. In most cases, doctors will respect your wishes even if you have a form from a different state. · You don't need an  to complete a living will. But legal advice can be helpful if your state's laws are unclear, your health history is complicated, or your family can't agree on what should be in your living will. · You can change your living will at any time. Some people find that their wishes about end-of-life care change as their health changes. · In addition to making a living will, think about completing a medical power of  form. This form lets you name the person you want to make end-of-life treatment decisions for you (your \"health care agent\") if you're not able to. Many hospitals and nursing homes will give you the forms you need to complete a living will and a medical power of . · Your living will is used only if you can't make or communicate decisions for yourself anymore. If you become able to make decisions again, you can accept or refuse any treatment, no matter what you wrote in your living will. · Your state may offer an online registry. This is a place where you can store your living will online so the doctors and nurses who need to treat you can find it right away. What should you think about when creating a living will? Talk about your end-of-life wishes with your family members and your doctor. Let them know what you want. That way the people making decisions for you won't be surprised by your choices. Think about these questions as you make your living will: · Do you know enough about life support methods that might be used? If not, talk to your doctor so you know what might be done if you can't breathe on your own, your heart stops, or you're unable to swallow. · What things would you still want to be able to do after you receive life-support methods? Would you want to be able to walk? To speak? To eat on your own? To live without the help of machines? · If you have a choice, where do you want to be cared for? In your home? At a hospital or nursing home? · Do you want certain Jain practices performed if you become very ill? · If you have a choice at the end of your life, where would you prefer to die? At home? In a hospital or nursing home? Somewhere else? · Would you prefer to be buried or cremated? · Do you want your organs to be donated after you die? What should you do with your living will? · Make sure that your family members and your health care agent have copies of your living will. · Give your doctor a copy of your living will to keep in your medical record. If you have more than one doctor, make sure that each one has a copy. · You may want to put a copy of your living will where it can be easily found. Where can you learn more? Go to http://julio c-fer.info/. Enter N736 in the search box to learn more about \"Learning About Living Tuyet. \" Current as of: August 8, 2016 Content Version: 11.3 © 9658-8937 [employer]. Care instructions adapted under license by [employer] (which disclaims liability or warranty for this information). If you have questions about a medical condition or this instruction, always ask your healthcare professional. [place] [address] any warranty or liability for your use of this information. Advance Directives: Care Instructions Your Care Instructions An advance directive is a legal way to state your wishes at the end of your life. It tells your family and your doctor what to do if you can no longer say what you want. There are two main types of advance directives. You can change them any time that your wishes change. · A living will tells your family and your doctor your wishes about life support and other treatment. · A durable power of  for health care lets you name a person to make treatment decisions for you when you can't speak for yourself. This person is called a health care agent. If you do not have an advance directive, decisions about your medical care may be made by a doctor or a  who doesn't know you. It may help to think of an advance directive as a gift to the people who care for you. If you have one, they won't have to make tough decisions by themselves. Follow-up care is a key part of your treatment and safety. Be sure to make and go to all appointments, and call your doctor if you are having problems. It's also a good idea to know your test results and keep a list of the medicines you take. How can you care for yourself at home? · Discuss your wishes with your loved ones and your doctor. This way, there are no surprises. · Many states have a unique form. Or you might use a universal form that has been approved by many states. This kind of form can sometimes be completed and stored online. Your electronic copy will then be available wherever you have a connection to the Internet. In most cases, doctors will respect your wishes even if you have a form from a different state. · You don't need a  to do an advance directive. But you may want to get legal advice. · Think about these questions when you prepare an advance directive: ¨ Who do you want to make decisions about your medical care if you are not able to? Many people choose a family member or close friend. ¨ Do you know enough about life support methods that might be used? If not, talk to your doctor so you understand. ¨ What are you most afraid of that might happen? You might be afraid of having pain, losing your independence, or being kept alive by machines. ¨ Where would you prefer to die? Choices include your home, a hospital, or a nursing home. ¨ Would you like to have information about hospice care to support you and your family? ¨ Do you want to donate organs when you die? ¨ Do you want certain Restorationism practices performed before you die? If so, put your wishes in the advance directive. · Read your advance directive every year, and make changes as needed. When should you call for help? Be sure to contact your doctor if you have any questions. Where can you learn more? Go to http://julio c-fer.info/. Enter R264 in the search box to learn more about \"Advance Directives: Care Instructions. \" Current as of: November 17, 2016 Content Version: 11.3 © 0878-2919 [employer]. Care instructions adapted under license by [employer] (which disclaims liability or warranty for this information). If you have questions about a medical condition or this instruction, always ask your healthcare professional. [address] any warranty or liability for your use of this information. Introducing [facility]! Amalia Klein introduces [employer] patient portal. Now you can access parts of your medical record, email your doctor's office, and request medication refills online. 1. In your internet browser, go to [email or web address] 2. Click on the First Time User? Click Here link in the Sign In box. You will see the New Member Sign Up page. 3. Enter your [employer] Access Code exactly as it appears below. You will not need to use this code after youve completed the sign-up process. If you do not sign up before the expiration date, you must request a new code. · [employer] Access Code: [email or web address] Expires: 1/8/2018  9:23 AM 
 
 4. Enter the last four digits of your Social Security Number (xxxx) and Date of Birth (mm/dd/yyyy) as indicated and click Submit. You will be taken to the next sign-up page. 5. Create a [employer]Im[employer] ID. This will be your [employer] login ID and cannot be changed, so think of one that is secure and easy to remember. 6. Create a [employer] password. You can change your password at any time. 7. Enter your Password Reset Question and Answer. This can be used at a later time if you forget your password. 8. Enter your e-mail address. You will receive e-mail notification when new information is available in [address] 9. Click Sign Up. You can now view and download portions of your medical record. 10. Click the Download Summary menu link to download a portable copy of your medical information. If you have questions, please visit the Frequently Asked Questions section of the [employer] website. Remember, [employer] is NOT to be used for urgent needs. For medical emergencies, dial 911. Now available from your iPhone and Android! Please provide this summary of care documentation to your next provider. Your primary care clinician is listed as Stephanie Nascimento. If you have any questions after today's visit, please call [phone].

## 2017-10-10 NOTE — PROGRESS NOTES
Rao Koenig is a 74 y.o. female who presents with the following  Chief Complaint   Patient presents with    Follow-up       HPI Patient comes in with  for a new onset of potential medication reaction. Has been doing well on Namzaric 28-10 since about June but the lynch was too expensive so she switched back to Namenda 10 mg BID and started this in September. About a week ago she developed from what she states is a rash on the face with blisters, itching, redness and irritation. She did not have a rash anywhere else on her body per her report. Things have since resolved but she states she tried to get in contact with our office multiple times, was left on hold for 30 minutes to an hour multiple times so finally called her PCP and evaluated. Took her off her hormone therapy. Since then her rash and symptoms are pretty much gone. Nothing bothering her other then our phone system and the inability for us to be reached. She states she thinks it may have been a reaction to the Namenda 10 mg BID as this was just started in the beginning of September. She also developed some dizziness, and overall aches and pains with this rash that are still hanging around. She also states she is having daily headaches and these got worse with her allergic reaction. No new face scrub,soaps, detergents. Nothing that she can pinpoint. She was sent here by PCP to evaluate our medications. No Known Allergies    Current Outpatient Prescriptions   Medication Sig    memantine-donepezil (NAMZARIC) 28-10 mg CSpX Take 1 Cap by mouth daily.  memantine (NAMENDA) 10 mg tablet Take 1 tab PO BID.    SYNTHROID 75 mcg tablet     gabapentin (NEURONTIN) 300 mg capsule Take 300 mg by mouth nightly.  naproxen (NAPROSYN) 500 mg tablet Take 500 mg by mouth two (2) times daily (with meals).  triamcinolone (NASACORT AQ) 55 mcg nasal inhaler 2 Sprays daily.  donepezil (ARICEPT) 10 mg tablet Take 1 Tab by mouth daily.     JANUVIA 100 mg tablet     co-enzyme Q-10 (CO Q-10) 100 mg capsule Take 200 mg by mouth daily.  DOCOSAHEXANOIC ACID/EPA (FISH OIL PO) Take 1,200 mg by mouth.  cholecalciferol (VITAMIN D3) 1,000 unit tablet Take  by mouth daily. No current facility-administered medications for this visit. History   Smoking Status    Never Smoker   Smokeless Tobacco    Never Used       Past Medical History:   Diagnosis Date    Diabetes ([address]     Gastrointestinal disorder     Joint pain     Muscle pain     Neuropathy     Obesity        Past Surgical History:   Procedure Laterality Date    COLONOSCOPY      ENDOSCOPY VISIT-OUTPATIENT      HX CHOLECYSTECTOMY      HX GYN      HX HYSTERECTOMY         Family History   Problem Relation Age of Onset    Stroke Mother     Stroke Father     Dementia Other     Heart Disease Other     Neuropathy Other        Social History     Social History    Marital status:      Spouse name: N/A    Number of children: N/A    Years of education: N/A     Social History Main Topics    Smoking status: Never Smoker    Smokeless tobacco: Never Used    Alcohol use No    Drug use: None    Sexual activity: Not Asked     Other Topics Concern    None     Social History Narrative       Review of Systems   HENT: Negative for ear pain, hearing loss and tinnitus. Eyes: Negative for blurred vision, double vision and photophobia. Respiratory: Negative for shortness of breath and wheezing. Cardiovascular: Negative for chest pain and palpitations. Gastrointestinal: Negative for nausea and vomiting. Musculoskeletal: Positive for joint pain. Skin: Positive for itching and rash. Neurological: Positive for dizziness and headaches. Negative for tingling, seizures and loss of consciousness. Psychiatric/Behavioral: Positive for memory loss. Remainder of comprehensive review is negative.      Physical Exam :    Visit Vitals    /72    Ht 5' 3\" (1.6 m)    Wt 95.3 kg (210 lb)    BMI 37.2 kg/m2       General: Well defined, nourished, and groomed individual in no acute distress.    Neck: Supple, nontender, no bruits, no pain with resistance to active range of motion.    Heart: Regular rate and rhythm, no murmurs, rub, or gallop. Normal S1S2. Lungs: Clear to auscultation bilaterally with equal chest expansion, no cough, no wheeze  Musculoskeletal: Extremities revealed no edema and had full range of motion of joints.    Psych: Good mood and bright affect    NEUROLOGICAL EXAMINATION:    Mental Status: Alert and oriented to person, place, and time    Cranial Nerves:    II, III, IV, VI: Visual acuity grossly intact. Visual fields are normal.    Pupils are equal, round, and reactive to light and accommodation.    Extra-ocular movements are full and fluid. Fundoscopic exam was benign, no ptosis or nystagmus.    V-XII: Hearing is grossly intact. Facial features are symmetric, with normal sensation and strength. The palate rises symmetrically and the tongue protrudes midline. Sternocleidomastoids 5/5. Motor Examination: Normal tone, bulk, and strength, 5/5 muscle strength throughout. Coordination: Finger to nose was normal. No resting or intention tremor    Gait and Station: Steady while walking. Normal arm swing. No pronator drift. No muscle wasting or fasiculations noted. Reflexes: DTRs 2+ throughout. Results for orders placed or performed during the hospital encounter of 09/20/16   POC CREATININE   Result Value Ref Range    Creatinine (POC) 1.0 0.6 - 1.3 MG/DL    GFRAA, POC >60 >60 ml/min/1.73m2    GFRNA, POC 56 (L) >60 ml/min/1.73m2       Orders Placed This Encounter    memantine-donepezil (NAMZARIC) 28-10 mg CSpX     Sig: Take 1 Cap by mouth daily. Dispense:  90 Cap     Refill:  1       1. Late onset Alzheimer's disease without behavioral disturbance        Follow-up Disposition:  Return in about 3 months (around 1/10/2018).     We discussed her symptoms and the likelihood of the namenda 10 mg BID being the causative factor is low as from what she described a rash on just the face may not be medication related as a true sensitivity would usually cause a full body reaction. In any case they wish to pay the extra go switch back to Namzaric 28-10 as she was doing well with this with no reactions to the medication. We discussed that this medication is better anyway. She is to restart her hormones soon through PCP and if rash comes back let us know as I can not use her symptoms as grounds to stop the namenda as she has been tolerating since April. She will monitor her symptoms. In regards to her headaches, dizziness, aches and pains. We discussed testing in an MRI, doppler, EEG to look at her worsening headahes and dizziness and she will defer at this time. Dizziness comes with positional changes to may be a orthostatic component to see PCP. Stay hydrated. She is upset about our phone system. Explained my chart is an easy way to get us but they do not use the computer in fear of a computer breach. Gave them the phone numbers and will pass on to manager. Call with any concerns.          This note will not be viewable in [employer]

## 2017-10-10 NOTE — PATIENT INSTRUCTIONS
[facility]   Statement to Patients  April 1, 2014      In an effort to ensure the large volume of patient prescription refills is processed in the most efficient and expeditious manner, we are asking our patients to assist us by calling your Pharmacy for all prescription refills, this will include also your  Mail Order Pharmacy. The pharmacy will contact our office electronically to continue the refill process. Please do not wait until the last minute to call your pharmacy. We need at least 48 hours (2days) to fill prescriptions. We also encourage you to call your pharmacy before going to  your prescription to make sure it is ready. With regard to controlled substance prescription refill requests (narcotic refills) that need to be picked up at our office, we ask your cooperation by providing us with at least 72 hours (3days) notice that you will need a refill. We will not refill narcotic prescription refill requests after 4:00pm on any weekday, Monday through Thursday, or after 2:00pm on Fridays, or on the weekends. We encourage everyone to explore another way of getting your prescription refill request processed using [employer], our patient web portal through our electronic medical record system. [employer] is an efficient and effective way to communicate your medication request directly to the office and  downloadable as an valerie on your smart phone . [employer] also features a review functionality that allows you to view your medication list as well as leave messages for your physician. Are you ready to get connected? If so please review the attatched instructions or speak to any of our staff to get you set up right away! Thank you so much for your cooperation. Should you have any questions please contact our Practice Administrator. The Physicians and Staff,  [facility] [id number] [address]  What is a living will?   A living will is a legal form you use to write down the kind of care you want at the end of your life. It is used by the health professionals who will treat you if you aren't able to decide for yourself. If you put your wishes in writing, your loved ones and others will know what kind of care you want. They won't need to guess. This can ease your mind and be helpful to others. A living will is not the same as an estate or property will. An estate will explains what you want to happen with your money and property after you die. Is a living will a legal document? A living will is a legal document. Each state has its own laws about living qureshi. If you move to another state, make sure that your living will is legal in the state where you now live. Or you might use a universal form that has been approved by many states. This kind of form can sometimes be completed and stored online. Your electronic copy will then be available wherever you have a connection to the Internet. In most cases, doctors will respect your wishes even if you have a form from a different state. · You don't need an  to complete a living will. But legal advice can be helpful if your state's laws are unclear, your health history is complicated, or your family can't agree on what should be in your living will. · You can change your living will at any time. Some people find that their wishes about end-of-life care change as their health changes. · In addition to making a living will, think about completing a medical power of  form. This form lets you name the person you want to make end-of-life treatment decisions for you (your \"health care agent\") if you're not able to. Many hospitals and nursing homes will give you the forms you need to complete a living will and a medical power of . · Your living will is used only if you can't make or communicate decisions for yourself anymore.  If you become able to make decisions again, you can accept or refuse any treatment, no matter what you wrote in your living will. · Your state may offer an online registry. This is a place where you can store your living will online so the doctors and nurses who need to treat you can find it right away. What should you think about when creating a living will? Talk about your end-of-life wishes with your family members and your doctor. Let them know what you want. That way the people making decisions for you won't be surprised by your choices. Think about these questions as you make your living will:  · Do you know enough about life support methods that might be used? If not, talk to your doctor so you know what might be done if you can't breathe on your own, your heart stops, or you're unable to swallow. · What things would you still want to be able to do after you receive life-support methods? Would you want to be able to walk? To speak? To eat on your own? To live without the help of machines? · If you have a choice, where do you want to be cared for? In your home? At a hospital or nursing home? · Do you want certain Hindu practices performed if you become very ill? · If you have a choice at the end of your life, where would you prefer to die? At home? In a hospital or nursing home? Somewhere else? · Would you prefer to be buried or cremated? · Do you want your organs to be donated after you die? What should you do with your living will? · Make sure that your family members and your health care agent have copies of your living will. · Give your doctor a copy of your living will to keep in your medical record. If you have more than one doctor, make sure that each one has a copy. · You may want to put a copy of your living will where it can be easily found. Where can you learn more? Go to http://julio c-fer.info/. Enter K555 in the search box to learn more about \"Learning About Living Peromarsanjay. \"  Current as of: August 8, 2016  Content Version: 11.3  © 8295-3609 [employer]. Care instructions adapted under license by [employer] (which disclaims liability or warranty for this information). If you have questions about a medical condition or this instruction, always ask your healthcare professional. [facility] [address] any warranty or liability for your use of this information. Advance Directives: Care Instructions  Your Care Instructions  An advance directive is a legal way to state your wishes at the end of your life. It tells your family and your doctor what to do if you can no longer say what you want. There are two main types of advance directives. You can change them any time that your wishes change. · A living will tells your family and your doctor your wishes about life support and other treatment. · A durable power of  for health care lets you name a person to make treatment decisions for you when you can't speak for yourself. This person is called a health care agent. If you do not have an advance directive, decisions about your medical care may be made by a doctor or a  who doesn't know you. It may help to think of an advance directive as a gift to the people who care for you. If you have one, they won't have to make tough decisions by themselves. Follow-up care is a key part of your treatment and safety. Be sure to make and go to all appointments, and call your doctor if you are having problems. It's also a good idea to know your test results and keep a list of the medicines you take. How can you care for yourself at home? · Discuss your wishes with your loved ones and your doctor. This way, there are no surprises. · Many states have a unique form. Or you might use a universal form that has been approved by many states. This kind of form can sometimes be completed and stored online.  Your electronic copy will then be available wherever you have a connection to the Internet. In most cases, doctors will respect your wishes even if you have a form from a different state. · You don't need a  to do an advance directive. But you may want to get legal advice. · Think about these questions when you prepare an advance directive:  ¨ Who do you want to make decisions about your medical care if you are not able to? Many people choose a family member or close friend. ¨ Do you know enough about life support methods that might be used? If not, talk to your doctor so you understand. ¨ What are you most afraid of that might happen? You might be afraid of having pain, losing your independence, or being kept alive by machines. ¨ Where would you prefer to die? Choices include your home, a hospital, or a nursing home. ¨ Would you like to have information about hospice care to support you and your family? ¨ Do you want to donate organs when you die? ¨ Do you want certain Baptism practices performed before you die? If so, put your wishes in the advance directive. · Read your advance directive every year, and make changes as needed. When should you call for help? Be sure to contact your doctor if you have any questions. Where can you learn more? Go to http://julio c-fer.info/. Enter R264 in the search box to learn more about \"Advance Directives: Care Instructions. \"  Current as of: November 17, 2016  Content Version: 11.3  © 1116-5929 [employer]. Care instructions adapted under license by [employer] (which disclaims liability or warranty for this information). If you have questions about a medical condition or this instruction, always ask your healthcare professional. [address] any warranty or liability for your use of this information.

## 2018-01-02 ENCOUNTER — OFFICE VISIT (OUTPATIENT)
Dept: NEUROLOGY | Age: 68
End: 2018-01-02

## 2018-01-02 VITALS
BODY MASS INDEX: 37.92 KG/M2 | OXYGEN SATURATION: 98 % | HEIGHT: 63 IN | WEIGHT: 214 LBS | TEMPERATURE: 98.2 F | DIASTOLIC BLOOD PRESSURE: 72 MMHG | HEART RATE: 87 BPM | RESPIRATION RATE: 18 BRPM | SYSTOLIC BLOOD PRESSURE: 125 MMHG

## 2018-01-02 DIAGNOSIS — G30.1 LATE ONSET ALZHEIMER'S DISEASE WITHOUT BEHAVIORAL DISTURBANCE (HCC): Primary | ICD-10-CM

## 2018-01-02 DIAGNOSIS — R68.89 VIVID DREAM: ICD-10-CM

## 2018-01-02 DIAGNOSIS — F02.80 LATE ONSET ALZHEIMER'S DISEASE WITHOUT BEHAVIORAL DISTURBANCE (HCC): Primary | ICD-10-CM

## 2018-01-02 NOTE — MR AVS SNAPSHOT
Visit Information Date & Time Provider Department Dept. Phone Encounter #  
 1/2/2018 10:40 AM Reij Chisholm MD [address] [place] [address] [facility] [phone] [id number] Follow-up Instructions Return in about 6 months (around 7/2/2018). Upcoming Health Maintenance Date Due Hepatitis C Screening 1950 DTaP/Tdap/Td series (1 - Tdap) 8/7/1973 FOBT Q 1 YEAR AGE 50-75 8/7/2002 ZOSTER VACCINE AGE 60> 6/7/2012 Influenza Age 5 to Adult 8/1/2017 GLAUCOMA SCREENING Q2Y 8/7/2017 OSTEOPOROSIS SCREENING (DEXA) 8/7/2017 Pneumococcal 65+ Low/Medium Risk (1 of 2 - PCV13) 8/7/2017 MEDICARE YEARLY EXAM 8/7/2017 Allergies as of 1/2/2018  Review Complete On: 10/10/2017 By: Kenneth Guardado NP No Known Allergies Current Immunizations  Never Reviewed No immunizations on file. Not reviewed this visit Vitals BP Pulse Temp Resp Height(growth percentile) Weight(growth percentile) 125/72 87 98.2 °F (36.8 °C) (Oral) 18 5' 3\" (1.6 m) 214 lb (97.1 kg) SpO2 BMI OB Status Smoking Status 98% 37.91 kg/m2 Hysterectomy Never Smoker Vitals History BMI and BSA Data Body Mass Index Body Surface Area  
 37.91 kg/m 2 2.08 m 2 Preferred Pharmacy Pharmacy Name Phone  [facility] [phone] Your Updated Medication List  
  
   
This list is accurate as of: 1/2/18 11:28 AM.  Always use your most recent med list.  
  
  
  
  
 CO Q-10 100 mg capsule Generic drug:  co-enzyme Q-10 Take 200 mg by mouth daily. cycle joaquin capsule Commonly known as:  cycle joaquin Take  by mouth. Take 1-2 capsule by mouth 1-2 hours before bedtime FISH OIL PO Take 1,200 mg by mouth.  
  
 gabapentin 300 mg capsule Commonly known as:  NEURONTIN Take 300 mg by mouth nightly. JANUVIA 100 mg tablet Generic drug:  SITagliptin  
  
 memantine-donepezil 28-10 mg Cspx Commonly known as:  MOTION PICTURE [facility] Take 1 Cap by mouth daily. NAPROSYN 500 mg tablet Generic drug:  naproxen Take 500 mg by mouth two (2) times daily (with meals). NASACORT AQ 55 mcg nasal inhaler Generic drug:  triamcinolone 2 Sprays daily. SYNTHROID 75 mcg tablet Generic drug:  levothyroxine VITAMIN D3 1,000 unit tablet Generic drug:  cholecalciferol Take  by mouth daily. Follow-up Instructions Return in about 6 months (around 7/2/2018). Patient Instructions PRESCRIPTION REFILL POLICY [facility] Statement to Patients April 1, 2014 In an effort to ensure the large volume of patient prescription refills is processed in the most efficient and expeditious manner, we are asking our patients to assist us by calling your Pharmacy for all prescription refills, this will include also your  Mail Order Pharmacy. The pharmacy will contact our office electronically to continue the refill process. Please do not wait until the last minute to call your pharmacy. We need at least 48 hours (2days) to fill prescriptions. We also encourage you to call your pharmacy before going to  your prescription to make sure it is ready. With regard to controlled substance prescription refill requests (narcotic refills) that need to be picked up at our office, we ask your cooperation by providing us with at least 72 hours (3days) notice that you will need a refill. We will not refill narcotic prescription refill requests after 4:00pm on any weekday, Monday through Thursday, or after 2:00pm on Fridays, or on the weekends. We encourage everyone to explore another way of getting your prescription refill request processed using [employer], our patient web portal through our electronic medical record system.  statusboomt is an efficient and effective way to communicate your medication request directly to the office and downloadable as an valerie on your smart phone . [employer] also features a review functionality that allows you to view your medication list as well as leave messages for your physician. Are you ready to get connected? If so please review the attatched instructions or speak to any of our staff to get you set up right away! Thank you so much for your cooperation. Should you have any questions please contact our Practice Administrator. The Physicians and Staff,  [facility] A Healthy Lifestyle: Care Instructions Your Care Instructions A healthy lifestyle can help you feel good, stay at a healthy weight, and have plenty of energy for both work and play. A healthy lifestyle is something you can share with your whole family. A healthy lifestyle also can lower your risk for serious health problems, such as high blood pressure, heart disease, and diabetes. You can follow a few steps listed below to improve your health and the health of your family. Follow-up care is a key part of your treatment and safety. Be sure to make and go to all appointments, and call your doctor if you are having problems. It's also a good idea to know your test results and keep a list of the medicines you take. How can you care for yourself at home? · Do not eat too much sugar, fat, or fast foods. You can still have dessert and treats now and then. The goal is moderation. · Start small to improve your eating habits. Pay attention to portion sizes, drink less juice and soda pop, and eat more fruits and vegetables. ¨ Eat a healthy amount of food. A 3-ounce serving of meat, for example, is about the size of a deck of cards. Fill the rest of your plate with vegetables and whole grains. ¨ Limit the amount of soda and sports drinks you have every day. Drink more water when you are thirsty. ¨ Eat at least 5 servings of fruits and vegetables every day.  It may seem like a lot, but it is not hard to reach this goal. A serving or helping is 1 piece of fruit, 1 cup of vegetables, or 2 cups of leafy, raw vegetables. Have an apple or some carrot sticks as an afternoon snack instead of a candy bar. Try to have fruits and/or vegetables at every meal. 
· Make exercise part of your daily routine. You may want to start with simple activities, such as walking, bicycling, or slow swimming. Try to be active 30 to 60 minutes every day. You do not need to do all 30 to 60 minutes all at once. For example, you can exercise 3 times a day for 10 or 20 minutes. Moderate exercise is safe for most people, but it is always a good idea to talk to your doctor before starting an exercise program. 
· Keep moving. Marlena Hench the lawn, work in the garden, or [employer]. Take the stairs instead of the elevator at work. · If you smoke, quit. People who smoke have an increased risk for heart attack, stroke, cancer, and other lung illnesses. Quitting is hard, but there are ways to boost your chance of quitting tobacco for good. ¨ Use nicotine gum, patches, or lozenges. ¨ Ask your doctor about stop-smoking programs and medicines. ¨ Keep trying. In addition to reducing your risk of diseases in the future, you will notice some benefits soon after you stop using tobacco. If you have shortness of breath or asthma symptoms, they will likely get better within a few weeks after you quit. · Limit how much alcohol you drink. Moderate amounts of alcohol (up to 2 drinks a day for men, 1 drink a day for women) are okay. But drinking too much can lead to liver problems, high blood pressure, and other health problems. Family health If you have a family, there are many things you can do together to improve your health. · Eat meals together as a family as often as possible. · Eat healthy foods. This includes fruits, vegetables, lean meats and dairy, and whole grains. · Include your family in your fitness plan. Most people think of activities such as jogging or tennis as the way to fitness, but there are many ways you and your family can be more active. Anything that makes you breathe hard and gets your heart pumping is exercise. Here are some tips: 
¨ Walk to do errands or to take your child to school or the bus. ¨ Go for a family bike ride after dinner instead of watching TV. Where can you learn more? Go to http://julio c-fer.info/. Enter M981 in the search box to learn more about \"A Healthy Lifestyle: Care Instructions. \" Current as of: May 12, 2017 Content Version: 11.4 © 1733-4531 [employer]. Care instructions adapted under license by [employer] (which disclaims liability or warranty for this information). If you have questions about a medical condition or this instruction, always ask your healthcare professional. [address] any warranty or liability for your use of this information. Introducing [facility]! Solomon Rhoades introduces Freebee patient portal. Now you can access parts of your medical record, email your doctor's office, and request medication refills online. 1. In your internet browser, go to [email or web address] 2. Click on the First Time User? Click Here link in the Sign In box. You will see the New Member Sign Up page. 3. Enter your Freebee Access Code exactly as it appears below. You will not need to use this code after youve completed the sign-up process. If you do not sign up before the expiration date, you must request a new code. · Freebee Access Code: [email or web address] Expires: 1/8/2018  8:23 AM 
 
4. Enter the last four digits of your Social Security Number (xxxx) and Date of Birth (mm/dd/yyyy) as indicated and click Submit. You will be taken to the next sign-up page. 5. Create a Pedius ID. This will be your Pedius login ID and cannot be changed, so think of one that is secure and easy to remember. 6. Create a Pedius password. You can change your password at any time. 7. Enter your Password Reset Question and Answer. This can be used at a later time if you forget your password. 8. Enter your e-mail address. You will receive e-mail notification when new information is available in [address] 9. Click Sign Up. You can now view and download portions of your medical record. 10. Click the Download Summary menu link to download a portable copy of your medical information. If you have questions, please visit the Frequently Asked Questions section of the Pedius website. Remember, Pedius is NOT to be used for urgent needs. For medical emergencies, dial 911. Now available from your iPhone and Android! Please provide this summary of care documentation to your next provider. Your primary care clinician is listed as Saurabh Salgado. If you have any questions after today's visit, please call [phone].

## 2018-01-02 NOTE — PATIENT INSTRUCTIONS
10 [facility]   Statement to Patients  April 1, 2014      In an effort to ensure the large volume of patient prescription refills is processed in the most efficient and expeditious manner, we are asking our patients to assist us by calling your Pharmacy for all prescription refills, this will include also your  Mail Order Pharmacy. The pharmacy will contact our office electronically to continue the refill process. Please do not wait until the last minute to call your pharmacy. We need at least 48 hours (2days) to fill prescriptions. We also encourage you to call your pharmacy before going to  your prescription to make sure it is ready. With regard to controlled substance prescription refill requests (narcotic refills) that need to be picked up at our office, we ask your cooperation by providing us with at least 72 hours (3days) notice that you will need a refill. We will not refill narcotic prescription refill requests after 4:00pm on any weekday, Monday through Thursday, or after 2:00pm on Fridays, or on the weekends. We encourage everyone to explore another way of getting your prescription refill request processed using mSpoke, our patient web portal through our electronic medical record system. mSpoke is an efficient and effective way to communicate your medication request directly to the office and  downloadable as an valerie on your smart phone . mSpoke also features a review functionality that allows you to view your medication list as well as leave messages for your physician. Are you ready to get connected? If so please review the attatched instructions or speak to any of our staff to get you set up right away! Thank you so much for your cooperation. Should you have any questions please contact our Practice Administrator.     The Physicians and Staff,  [facility]              A Healthy Lifestyle: Care Instructions  Your Care Instructions    A healthy lifestyle can help you feel good, stay at a healthy weight, and have plenty of energy for both work and play. A healthy lifestyle is something you can share with your whole family. A healthy lifestyle also can lower your risk for serious health problems, such as high blood pressure, heart disease, and diabetes. You can follow a few steps listed below to improve your health and the health of your family. Follow-up care is a key part of your treatment and safety. Be sure to make and go to all appointments, and call your doctor if you are having problems. It's also a good idea to know your test results and keep a list of the medicines you take. How can you care for yourself at home? · Do not eat too much sugar, fat, or fast foods. You can still have dessert and treats now and then. The goal is moderation. · Start small to improve your eating habits. Pay attention to portion sizes, drink less juice and soda pop, and eat more fruits and vegetables. ¨ Eat a healthy amount of food. A 3-ounce serving of meat, for example, is about the size of a deck of cards. Fill the rest of your plate with vegetables and whole grains. ¨ Limit the amount of soda and sports drinks you have every day. Drink more water when you are thirsty. ¨ Eat at least 5 servings of fruits and vegetables every day. It may seem like a lot, but it is not hard to reach this goal. A serving or helping is 1 piece of fruit, 1 cup of vegetables, or 2 cups of leafy, raw vegetables. Have an apple or some carrot sticks as an afternoon snack instead of a candy bar. Try to have fruits and/or vegetables at every meal.  · Make exercise part of your daily routine. You may want to start with simple activities, such as walking, bicycling, or slow swimming. Try to be active 30 to 60 minutes every day. You do not need to do all 30 to 60 minutes all at once. For example, you can exercise 3 times a day for 10 or 20 minutes.  Moderate exercise is safe for most people, but it is always a good idea to talk to your doctor before starting an exercise program.  · Keep moving. [place] the lawn, work in the garden, or [employer]. Take the stairs instead of the elevator at work. · If you smoke, quit. People who smoke have an increased risk for heart attack, stroke, cancer, and other lung illnesses. Quitting is hard, but there are ways to boost your chance of quitting tobacco for good. ¨ Use nicotine gum, patches, or lozenges. ¨ Ask your doctor about stop-smoking programs and medicines. ¨ Keep trying. In addition to reducing your risk of diseases in the future, you will notice some benefits soon after you stop using tobacco. If you have shortness of breath or asthma symptoms, they will likely get better within a few weeks after you quit. · Limit how much alcohol you drink. Moderate amounts of alcohol (up to 2 drinks a day for men, 1 drink a day for women) are okay. But drinking too much can lead to liver problems, high blood pressure, and other health problems. Family health  If you have a family, there are many things you can do together to improve your health. · Eat meals together as a family as often as possible. · Eat healthy foods. This includes fruits, vegetables, lean meats and dairy, and whole grains. · Include your family in your fitness plan. Most people think of activities such as jogging or tennis as the way to fitness, but there are many ways you and your family can be more active. Anything that makes you breathe hard and gets your heart pumping is exercise. Here are some tips:  ¨ Walk to do errands or to take your child to school or the bus. ¨ Go for a family bike ride after dinner instead of watching TV. Where can you learn more? Go to http://julio c-fer.info/. Enter W046 in the search box to learn more about \"A Healthy Lifestyle: Care Instructions. \"  Current as of:  May 12, 2017  Content Version: 11.4  © 0006-0537 Healthwise, Incorporated. Care instructions adapted under license by [employer] (which disclaims liability or warranty for this information). If you have questions about a medical condition or this instruction, always ask your healthcare professional. [place] [address] any warranty or liability for your use of this information.

## 2018-01-02 NOTE — PROGRESS NOTES
Date:  18     Name:  Arpan Ashraf  :  1950  MRN:  [id number]     PCP:  Rita Rea MD    Chief Complaint   Patient presents with    Dementia     easily frustrated with tasks, +nightmares, fair appetite (+sweets), keeping busy with crafts       HISTORY OF PRESENT ILLNESS: Follow up for dementia. She notes that she is doing well. She is currently taking Namzaric daily. She denies any side effect. No rashes. She notes that she is still getting headaches. She notes that they come and go. They are located on the left side. She as notes that she feet easily frustrated with task and increase nightmares. The nightmare also come and goes. She will have 3 night with nightmare then goes away for a couple weeks. Except as noted above, denies  fever, chills, cough. No CP or SOB. No dysuria, loss of bowel or bladder control. No Weight loss. Appetite good. Sleeping well. No sweats. No edema. No bruising or bleeding. No nausea or vomit. No diarrhea. No frequency, urgency, No depressive sxs. No anxiety. Denies sore throat, nasal congestion, nasal discharge, epistaxis, tinnitus, hearing loss, back pain, muscle pain, or joint pain. Current Outpatient Prescriptions   Medication Sig    cycle evert (CYCLE EVERT) capsule Take  by mouth. Take 1-2 capsule by mouth 1-2 hours before bedtime    memantine-donepezil (NAMZARIC) 28-10 mg CSpX Take 1 Cap by mouth daily.  SYNTHROID 75 mcg tablet     gabapentin (NEURONTIN) 300 mg capsule Take 300 mg by mouth nightly.  naproxen (NAPROSYN) 500 mg tablet Take 500 mg by mouth two (2) times daily (with meals).  triamcinolone (NASACORT AQ) 55 mcg nasal inhaler 2 Sprays daily.  JANUVIA 100 mg tablet     co-enzyme Q-10 (CO Q-10) 100 mg capsule Take 200 mg by mouth daily.  DOCOSAHEXANOIC ACID/EPA (FISH OIL PO) Take 1,200 mg by mouth.  cholecalciferol (VITAMIN D3) 1,000 unit tablet Take  by mouth daily.      No current facility-administered medications for this visit. No Known Allergies  Past Medical History:   Diagnosis Date    Diabetes ([address]     Gastrointestinal disorder     Joint pain     Muscle pain     Neuropathy     Obesity      Past Surgical History:   Procedure Laterality Date    COLONOSCOPY      ENDOSCOPY VISIT-OUTPATIENT      HX CHOLECYSTECTOMY      HX GYN      HX HYSTERECTOMY       Social History     Social History    Marital status:      Spouse name: N/A    Number of children: N/A    Years of education: N/A     Occupational History    Not on file. Social History Main Topics    Smoking status: Never Smoker    Smokeless tobacco: Never Used    Alcohol use No    Drug use: Not on file    Sexual activity: Not on file     Other Topics Concern    Not on file     Social History Narrative     Family History   Problem Relation Age of Onset    Stroke Mother     Stroke Father     Dementia Other     Heart Disease Other     Neuropathy Other        PHYSICAL EXAMINATION:    Visit Vitals    /72    Pulse 87    Temp 98.2 °F (36.8 °C) (Oral)    Resp 18    Ht 5' 3\" (1.6 m)    Wt 214 lb (97.1 kg)    SpO2 98%    BMI 37.91 kg/m2   General: Well defined, nourished, and groomed individual in no acute distress. Neck: Supple, nontender, no bruits, no pain with resistance to active range of motion. Heart: Regular rate and rhythm, no murmurs, rub, or gallop. Normal S1S2. Lungs: Clear to auscultation bilaterally with equal chest expansion, no cough, no wheeze  Musculoskeletal: Extremities revealed no edema and had full range of motion of joints. Psych: Good mood and bright affect      NEUROLOGICAL EXAMINATION:   Mental Status: Alert and oriented to person, place, and time       Cranial Nerves:   II, III, IV, VI: Visual acuity grossly intact. Visual fields are normal.   Pupils are equal, round, and reactive to light and accommodation. Extra-ocular movements are full and fluid.  Fundoscopic exam was benign, no ptosis or nystagmus. V-XII: Hearing is grossly intact. Facial features are symmetric, with normal sensation and strength. The palate rises symmetrically and the tongue protrudes midline. Sternocleidomastoids 5/5.       Motor Examination: Normal tone, bulk, and strength, 5/5 muscle strength throughout.       Coordination: No resting or intention tremor      Gait and Station: Steady while walking. Normal arm swing. No pronator drift. No muscle wasting or fasiculations noted.       Reflexes: DTRs 2+ throughout. ASSESSMENT AND PLAN    Kacy Koehler NP  I have reviewed the documentation provided by the nurse practitioner, Ms. Mariano Barrios, and we have discussed her findings and the clinical impression. I have formulated with her the proposed management plans for this patient. Additionally,  I have personally evaluated the patient to verify the history and to confirm physical findings. Below are my additional comments:    Patient returns today with her  in follow-up for dementia of the Alzheimer's type. She has been maintained on Namzaric. She gets some troublesome dreams here there but not consistent. Not bothersome. Just feels that they are more vivid. No agitation. No aggression. Feels that she is doing well in terms of her memory.  concurs. They note stability. She will get a headache here or there nothing major. No associated symptoms. Again just bothersome. Has not tried any Tylenol. We discussed that today. On exam she looks well. She is pleasant cooperative and interactive and she has appropriate dress grooming and affect. She discusses current events and is oriented. Intact cranial nerves. No pronation or drift. No ataxia. Gait steady. She will continue with her Namzaric for treatment of Alzheimer's type dementia. Continue to stay active in all spheres.   Keep a watch on the headaches and just use some over-the-counter Tylenol as long as they are infrequent I do not think there is much else to do in that regard. In terms of the dreams we discussed that I have seen some people on Aricept have some vivid dreams but at this point they are infrequent and not bothersome so again take a wait and see approach. Follow-up in 6 months and certainly sooner should circumstances dictate otherwise. Chapis Barakat MD  This note will not be viewable in [address]

## 2018-07-03 ENCOUNTER — OFFICE VISIT (OUTPATIENT)
Dept: NEUROLOGY | Age: 68
End: 2018-07-03

## 2018-07-03 VITALS
HEIGHT: 63 IN | BODY MASS INDEX: 38.09 KG/M2 | OXYGEN SATURATION: 96 % | RESPIRATION RATE: 18 BRPM | HEART RATE: 66 BPM | TEMPERATURE: 97.7 F | DIASTOLIC BLOOD PRESSURE: 68 MMHG | WEIGHT: 215 LBS | SYSTOLIC BLOOD PRESSURE: 98 MMHG

## 2018-07-03 DIAGNOSIS — G30.1 LATE ONSET ALZHEIMER'S DISEASE WITHOUT BEHAVIORAL DISTURBANCE (HCC): Primary | ICD-10-CM

## 2018-07-03 DIAGNOSIS — F02.80 LATE ONSET ALZHEIMER'S DISEASE WITHOUT BEHAVIORAL DISTURBANCE (HCC): Primary | ICD-10-CM

## 2018-07-03 RX ORDER — FLUTICASONE PROPIONATE 50 MCG
SPRAY, SUSPENSION (ML) NASAL
COMMUNITY
Start: 2018-03-30 | End: 2018-07-03

## 2018-07-03 RX ORDER — TRIAMTERENE/HYDROCHLOROTHIAZID 37.5-25 MG
TABLET ORAL
COMMUNITY
Start: 2018-05-30 | End: 2018-07-03

## 2018-07-03 RX ORDER — PROGESTERONE, MICRONIZED 100 %
POWDER (GRAM) MISCELLANEOUS
COMMUNITY
Start: 2018-06-25 | End: 2018-07-03

## 2018-07-03 RX ORDER — ASPIRIN 81 MG/1
TABLET ORAL DAILY
COMMUNITY
End: 2022-08-23

## 2018-07-03 RX ORDER — AMOXICILLIN AND CLAVULANATE POTASSIUM 875; 125 MG/1; MG/1
TABLET, FILM COATED ORAL
COMMUNITY
Start: 2018-03-30 | End: 2018-07-03

## 2018-07-03 RX ORDER — NAPROXEN SODIUM 550 MG/1
TABLET ORAL
COMMUNITY
Start: 2018-03-28 | End: 2018-07-03

## 2018-07-03 RX ORDER — MINERAL OIL
ENEMA (ML) RECTAL
COMMUNITY

## 2018-07-03 NOTE — PROGRESS NOTES
5 [facility]   [place] [address]   [facility]    [facility]   [phone] Fax               Chief Complaint   Patient presents with    Dementia     follow up     Current Outpatient Prescriptions   Medication Sig Dispense Refill    fexofenadine (ALLEGRA) 180 mg tablet Take  by mouth.  aspirin delayed-release 81 mg tablet Take  by mouth daily.  NAMZARIC 28-10 mg CSpX TAKE 1 CAPSULE DAILY 90 Cap 3    cycle evert (CYCLE EVERT) capsule Take  by mouth. Take 1-2 capsule by mouth 1-2 hours before bedtime      SYNTHROID 75 mcg tablet       gabapentin (NEURONTIN) 300 mg capsule Take 300 mg by mouth nightly.  triamcinolone (NASACORT AQ) 55 mcg nasal inhaler 2 Sprays daily.  JANUVIA 100 mg tablet       co-enzyme Q-10 (CO Q-10) 100 mg capsule Take 200 mg by mouth daily.  DOCOSAHEXANOIC ACID/EPA (FISH OIL PO) Take 1,200 mg by mouth.  cholecalciferol (VITAMIN D3) 1,000 unit tablet Take  by mouth daily. No Known Allergies  Social History   Substance Use Topics    Smoking status: Never Smoker    Smokeless tobacco: Never Used    Alcohol use No     The patient returns today in follow up for dementia, the Alzheimer's type. She is accompanied by her . She is maintained on Namzaric. She is compliant with medication. She denies any side effects. She says her memory is terrible. When I ask her, she just says she cannot remember what she needs to do. She says when she is cooking, she has to really pay attention to the steps so that she does it right. She says sometimes she will find she is using the wrong pan and has to wash it out. She is driving minimally and her  says when she does drive she stays within 2-5 miles of the house. No accidents or near accidents. She has not gotten lost. No aggression, no wandering. No recent illness. No chest pain, palpitations, shortness of breath.          Examination  Visit Vitals  BP 98/68 (BP 1 Location: Left arm, BP Patient Position: Sitting)    Pulse 66    Temp 97.7 °F (36.5 °C) (Oral)    Resp 18    Ht 5' 3\" (1.6 m)    Wt 97.5 kg (215 lb)    SpO2 96%    BMI 38.09 kg/m2     She is pleasant and cooperative. She has appropriate dress and grooming. Appetite is appropriate. No edema. She is awake, alert, oriented to August and corrects herself to July 3, 2018. She describes the president and then with a hint, she recalls his name is Tony. She says in the news, everyone is killing everyone and it is just terrible how bad the world has gotten in that regard. I gave her some hints about the [employer] where Ani Ortega has just resigned, etc., to try to get her give me something in the news and she just says everyone is killing everyone, or a generic answer. She follows all commands. She is fluent. She correctly calculates 7 quarters to $1.75. She notes the appropriate floor we are on. No right/left confusion. No motor focality. Cranial nerves intact. Gait is steady. MedDATA/gwo                Impression/Plan  Dementia, AD type  Cont Namzaric  Cont to be active in all spheres and she shows me her hazel to demonstrate she is reading  Discussed watchful course on driving   Answered questions re meds, dementia, progression, etc    Follow in 6 months    Joce Koroma MD    Total time: 25 min   Counseling / coordination time: 15 min   > 50% counseling / coordination?: Yes re: as documented above            This note was created using voice recognition software. Despite editing, there may be syntax errors. This note will not be viewable in [address] This note will not be viewable in [address]

## 2018-07-03 NOTE — PROGRESS NOTES
Chief Complaint   Patient presents with    Dementia     follow up     1. Have you been to the ER, urgent care clinic since your last visit? Hospitalized since your last visit? No    2. Have you seen or consulted any other health care providers outside of the [address] [place] [address] since your last visit? Include any pap smears or colon screening. Seen by PCP for routine check up and Chiropractor within the last 6 months.

## 2018-07-03 NOTE — MR AVS SNAPSHOT
[address] [facility] 
[phone] Patient: Margy Maravilla MRN: [id number] JSS:[id number] Visit Information Date & Time Provider Department Dept. Phone Encounter #  
 7/3/2018 11:00 AM Etelvina Shaikh MD [facility] [phone] [id number] Follow-up Instructions Return in about 6 months (around 1/3/2019). Upcoming Health Maintenance Date Due Hepatitis C Screening 1950 DTaP/Tdap/Td series (1 - Tdap) 8/7/1973 BREAST CANCER SCRN MAMMOGRAM 8/7/2002 FOBT Q 1 YEAR AGE 50-75 8/7/2002 ZOSTER VACCINE AGE 60> 6/7/2012 GLAUCOMA SCREENING Q2Y 8/7/2017 Bone Densitometry (Dexa) Screening 8/7/2017 Pneumococcal 65+ Low/Medium Risk (1 of 2 - PCV13) 8/7/2017 Influenza Age 5 to Adult 8/1/2018 Allergies as of 7/3/2018  Review Complete On: 7/3/2018 By: Etelvina Shaikh MD  
 No Known Allergies Current Immunizations  Never Reviewed No immunizations on file. Not reviewed this visit Vitals BP Pulse Temp Resp Height(growth percentile) Weight(growth percentile) 98/68 (BP 1 Location: Left arm, BP Patient Position: Sitting) 66 97.7 °F (36.5 °C) (Oral) 18 5' 3\" (1.6 m) 215 lb (97.5 kg) SpO2 BMI OB Status Smoking Status 96% 38.09 kg/m2 Hysterectomy Never Smoker Vitals History BMI and BSA Data Body Mass Index Body Surface Area 38.09 kg/m 2 2.08 m 2 Preferred Pharmacy Pharmacy Name Phone  [address] [phone] Your Updated Medication List  
  
   
This list is accurate as of 7/3/18 11:24 AM.  Always use your most recent med list.  
  
  
  
  
 aspirin delayed-release 81 mg tablet Take  by mouth daily. CO Q-10 100 mg capsule Generic drug:  co-enzyme Q-10 Take 200 mg by mouth daily. cycle joaquin capsule Commonly known as:  cycle joaquin Take  by mouth. Take 1-2 capsule by mouth 1-2 hours before bedtime  
  
 fexofenadine 180 mg tablet Commonly known as:  Rian Douglas Take  by mouth. FISH OIL PO Take 1,200 mg by mouth.  
  
 gabapentin 300 mg capsule Commonly known as:  NEURONTIN Take 300 mg by mouth nightly. JANUVIA 100 mg tablet Generic drug:  SITagliptin NAMZARIC 28-10 mg Cspx Generic drug:  memantine-donepezil TAKE 1 CAPSULE DAILY  
  
 NASACORT AQ 55 mcg nasal inhaler Generic drug:  triamcinolone 2 Sprays daily. SYNTHROID 75 mcg tablet Generic drug:  levothyroxine VITAMIN D3 1,000 unit tablet Generic drug:  cholecalciferol Take  by mouth daily. Follow-up Instructions Return in about 6 months (around 1/3/2019). Introducing [facility]! [employer] introduces Familonet patient portal. Now you can access parts of your medical record, email your doctor's office, and request medication refills online. 1. In your internet browser, go to [email or web address] 2. Click on the First Time User? Click Here link in the Sign In box. You will see the New Member Sign Up page. 3. Enter your Familonet Access Code exactly as it appears below. You will not need to use this code after youve completed the sign-up process. If you do not sign up before the expiration date, you must request a new code. · Familonet Access Code: [email or web address] Expires: 10/1/2018 11:24 AM 
 
4. Enter the last four digits of your Social Security Number (xxxx) and Date of Birth (mm/dd/yyyy) as indicated and click Submit. You will be taken to the next sign-up page. 5. Create a Familonet ID. This will be your Familonet login ID and cannot be changed, so think of one that is secure and easy to remember. 6. Create a Familonet password. You can change your password at any time. 7. Enter your Password Reset Question and Answer. This can be used at a later time if you forget your password. 8. Enter your e-mail address. You will receive e-mail notification when new information is available in [address] 9. Click Sign Up. You can now view and download portions of your medical record. 10. Click the Download Summary menu link to download a portable copy of your medical information. If you have questions, please visit the Frequently Asked Questions section of the Strobe website. Remember, Strobe is NOT to be used for urgent needs. For medical emergencies, dial 911. Now available from your iPhone and Android! Please provide this summary of care documentation to your next provider. Your primary care clinician is listed as Mary Quinonez. If you have any questions after today's visit, please call [phone].

## 2019-01-08 ENCOUNTER — OFFICE VISIT (OUTPATIENT)
Dept: NEUROLOGY | Age: 69
End: 2019-01-08

## 2019-01-08 VITALS
HEIGHT: 63 IN | HEART RATE: 78 BPM | DIASTOLIC BLOOD PRESSURE: 72 MMHG | WEIGHT: 214 LBS | BODY MASS INDEX: 37.92 KG/M2 | SYSTOLIC BLOOD PRESSURE: 114 MMHG | TEMPERATURE: 97.8 F | RESPIRATION RATE: 16 BRPM | OXYGEN SATURATION: 97 %

## 2019-01-08 DIAGNOSIS — G30.1 LATE ONSET ALZHEIMER'S DISEASE WITHOUT BEHAVIORAL DISTURBANCE (HCC): Primary | ICD-10-CM

## 2019-01-08 DIAGNOSIS — F02.80 LATE ONSET ALZHEIMER'S DISEASE WITHOUT BEHAVIORAL DISTURBANCE (HCC): Primary | ICD-10-CM

## 2019-01-08 PROBLEM — E66.01 SEVERE OBESITY (HCC): Status: ACTIVE | Noted: 2019-01-08

## 2019-01-08 NOTE — PROGRESS NOTES
Chief Complaint Patient presents with  Dementia  
spouse feels memory  Is digressing with every day household things Health Maintenance Due Topic Date Due  
 Hepatitis C Screening  1950  DTaP/Tdap/Td series (1 - Tdap) 08/07/1973  Shingrix Vaccine Age 50> (1 of 2) 08/07/2002  BREAST CANCER SCRN MAMMOGRAM  08/07/2002  FOBT Q 1 YEAR AGE 50-75  08/07/2002  GLAUCOMA SCREENING Q2Y  08/07/2017  Bone Densitometry (Dexa) Screening  08/07/2017  Pneumococcal 65+ Low/Medium Risk (1 of 2 - PCV13) 08/07/2017 Coordination of Care Questions 1. Have you been to the ER, urgent care clinic outside of [address] [place] [address] since your last visit? Pt first 
    Hospitalized since your last visit? No 
 
2. Have you seen or consulted any other health care providers outside of the [address] [place] [address] since your last visit? Include any pap smears or colon screening.  No

## 2019-01-08 NOTE — PROGRESS NOTES
Maritza We-07-A 1498 [address] [facility] Jeanne Castro 59  
[phone] Fax Chief Complaint Patient presents with  Dementia Current Outpatient Medications Medication Sig Dispense Refill  fexofenadine (ALLEGRA) 180 mg tablet Take  by mouth.  aspirin delayed-release 81 mg tablet Take  by mouth daily.  NAMZARIC 28-10 mg CSpX TAKE 1 CAPSULE DAILY 90 Cap 3  cycle evert (CYCLE EVERT) capsule Take  by mouth. Take 1-2 capsule by mouth 1-2 hours before bedtime  SYNTHROID 75 mcg tablet  gabapentin (NEURONTIN) 300 mg capsule Take 300 mg by mouth nightly.  triamcinolone (NASACORT AQ) 55 mcg nasal inhaler 2 Sprays daily.  JANUVIA 100 mg tablet Take 100 mg by mouth daily.  co-enzyme Q-10 (CO Q-10) 100 mg capsule Take 200 mg by mouth daily.  DOCOSAHEXANOIC ACID/EPA (FISH OIL PO) Take 1,200 mg by mouth.  cholecalciferol (VITAMIN D3) 1,000 unit tablet Take  by mouth daily. No Known Allergies Social History Tobacco Use  Smoking status: Never Smoker  Smokeless tobacco: Never Used Substance Use Topics  Alcohol use: No  
 Drug use: Not on file Patient returns today for follow-up dementia of the Alzheimer's type. She is accompanied by her  and both provide history. She thinks she is about the same. She notes that she believes her  is moving things around the kitchen because it is harder to find.  says that she is having more trouble finding where things are kept. No other major difficulties there. She continues to use her tablet. Not really exercising. Not really being social and getting out. He says that she does not like to get out much. Having more difficulty with higher level executive functioning such as making decisions and she gets frustrated easily. No dangerous behavior.   She is tolerating Namzaric without perceived side effect. No recent illness. No fever chills chest pain. Examination Visit Vitals /72 (BP 1 Location: Left arm, BP Patient Position: Sitting) Pulse 78 Temp 97.8 °F (36.6 °C) (Oral) Resp 16 Ht 5' 3\" (1.6 m) Wt 97.1 kg (214 lb) SpO2 97% BMI 37.91 kg/m² Pleasant lady. She is awake alert and oriented to January 8 and says the year is either 2019 or 2020. She recalls the current president and she recalls the previous president after being given a hint. She is fluent. Follows all commands. Correctly calculates. She navigates a cryptic phrase. No motor focality. No cranial nerve deficit. Steady gait. Impression/Plan Alzheimer's type dementia fairly stable at this point with perhaps a bit of decline more forgetfulness etc.  Discussed today at length the need to be active in all spheres physically, cognitively, and socially. Continue Namzaric. Questions today answered in detail regarding any open studies, with an article relating Alzheimer's as a type 3 diabetes, etc.  Prescription renewed today. Follow in 6 months unless circumstances dictate otherwise. Total time: 25 min Counseling / coordination time: 15 min  
> 50% counseling / coordination?: Yes re: a documented above James Mendieta MD 
 
 
This note was created using voice recognition software. Despite editing, there may be syntax errors. This note will not be viewable in [address]

## 2019-06-11 ENCOUNTER — TELEPHONE (OUTPATIENT)
Dept: NEUROLOGY | Age: 69
End: 2019-06-11

## 2019-06-11 NOTE — LETTER
6/12/2019 3:52 PM 
 
Ms. Mayra Ochoa 47 [address] [address] To whom it may concern. Mrs. Mitzi Amin is under [facility] care [facility] for [facility]'s dementia. If you have any questions, please have  Daniel contact my office. Sincerely, Oskar Carbajal MD

## 2019-06-12 NOTE — TELEPHONE ENCOUNTER
Pt needs letter stating she has Alzheimer's to turn in for [employer] duty.   Letter will be written and placed at  for p/u tomorrow

## 2019-07-22 ENCOUNTER — OFFICE VISIT (OUTPATIENT)
Dept: NEUROLOGY | Age: 69
End: 2019-07-22

## 2019-07-22 VITALS
BODY MASS INDEX: 37.91 KG/M2 | SYSTOLIC BLOOD PRESSURE: 134 MMHG | DIASTOLIC BLOOD PRESSURE: 80 MMHG | OXYGEN SATURATION: 97 % | HEIGHT: 63 IN | HEART RATE: 71 BPM | RESPIRATION RATE: 20 BRPM

## 2019-07-22 DIAGNOSIS — G30.1 LATE ONSET ALZHEIMER'S DISEASE WITHOUT BEHAVIORAL DISTURBANCE (HCC): Primary | ICD-10-CM

## 2019-07-22 DIAGNOSIS — E11.42 DIABETIC PERIPHERAL NEUROPATHY (HCC): ICD-10-CM

## 2019-07-22 DIAGNOSIS — G25.0 ESSENTIAL TREMOR: ICD-10-CM

## 2019-07-22 DIAGNOSIS — F02.80 LATE ONSET ALZHEIMER'S DISEASE WITHOUT BEHAVIORAL DISTURBANCE (HCC): Primary | ICD-10-CM

## 2019-07-22 NOTE — PROGRESS NOTES
Dementia- she stated she has doing okay since her last visit still taking the namzaric     Some behavioral changes- more difficulty finding things in the kitchen, he stated she cant remember where some things go so she picks a new spot     Will ask him things more often like the day of the week, date 3 or 4 times within in like a half an hour time frame   She will talk to herself sometimes too

## 2019-07-22 NOTE — PROGRESS NOTES
[address] [facility],[address]  [address]   [place] [address] [place] [address] [address]   [phone] Office   [phone] Fax           Date:  19     Name:  Aaron Mcdonald  :  1950  MRN:  [id number]     PCP:  Ce Ortiz MD    Chief Complaint   Patient presents with    Dementia     HISTORY OF PRESENT ILLNESS: Follow up for dementia, likely Alzheimer's type. She is accompanied by her  who helps with providing history. This is a usual patient of Dr. Isabel Talley who was last seen in January of this year. She continues to indicate that she feels that her memory is about the same. Her  indicates that there are times when she is a little repetitive and she continues to have difficulty finding things is because she does not remember moving them around. He also finds that she gets frustrated easily which is not different. He does not allow her to cook anymore. She is independent with activities of daily living. There are no concerns for dangerous behaviors such as wandering. In fact, she does not really like to go out much in general.  She does have an increase in complaints regarding her pain in her legs and her head tremor. Except as noted above, denies  fever, chills, cough. No CP or SOB. No dysuria, loss of bowel or bladder control. No Weight loss. Appetite good. Sleeping well. No sweats. No edema. No bruising or bleeding. No nausea or vomit. No diarrhea. No frequency, urgency, No depressive sxs. No anxiety. Denies sore throat, nasal congestion, nasal discharge, epistaxis, tinnitus, hearing loss, back pain, muscle pain, or joint pain. Current Outpatient Medications   Medication Sig    memantine-donepezil (NAMZARIC) 28-10 mg CSpX TAKE 1 CAPSULE DAILY    fexofenadine (ALLEGRA) 180 mg tablet Take  by mouth.  aspirin delayed-release 81 mg tablet Take  by mouth daily.     cycle evert (CYCLE EVERT) capsule Take  by mouth. Take 1-2 capsule by mouth 1-2 hours before bedtime    SYNTHROID 75 mcg tablet     gabapentin (NEURONTIN) 300 mg capsule Take 300 mg by mouth nightly.  triamcinolone (NASACORT AQ) 55 mcg nasal inhaler 2 Sprays daily.  JANUVIA 100 mg tablet Take 100 mg by mouth daily.  co-enzyme Q-10 (CO Q-10) 100 mg capsule Take 200 mg by mouth daily.  DOCOSAHEXANOIC ACID/EPA (FISH OIL PO) Take 1,200 mg by mouth.  cholecalciferol (VITAMIN D3) 1,000 unit tablet Take  by mouth daily. No current facility-administered medications for this visit.       No Known Allergies  Past Medical History:   Diagnosis Date    Diabetes ([address]     Gastrointestinal disorder     Joint pain     Muscle pain     Neuropathy     Obesity      Past Surgical History:   Procedure Laterality Date    COLONOSCOPY      ENDOSCOPY VISIT-OUTPATIENT      HX CHOLECYSTECTOMY      HX GYN      HX HYSTERECTOMY       Social History     Socioeconomic History    Marital status:      Spouse name: Not on file    Number of children: Not on file    Years of education: Not on file    Highest education level: Not on file   Occupational History    Not on file   Social Needs    Financial resource strain: Not on file    Food insecurity:     Worry: Not on file     Inability: Not on file    Transportation needs:     Medical: Not on file     Non-medical: Not on file   Tobacco Use    Smoking status: Never Smoker    Smokeless tobacco: Never Used   Substance and Sexual Activity    Alcohol use: No    Drug use: Not on file    Sexual activity: Not on file   Lifestyle    Physical activity:     Days per week: Not on file     Minutes per session: Not on file    Stress: Not on file   Relationships    Social connections:     Talks on phone: Not on file     Gets together: Not on file     Attends Oriental orthodox service: Not on file     Active member of club or organization: Not on file     Attends meetings of clubs or organizations: Not on file     Relationship status: Not on file    Intimate partner violence:     Fear of current or ex partner: Not on file     Emotionally abused: Not on file     Physically abused: Not on file     Forced sexual activity: Not on file   Other Topics Concern    Not on file   Social History Narrative    Not on file     Family History   Problem Relation Age of Onset    Stroke Mother     Stroke Father     Dementia Other     Heart Disease Other     Neuropathy Other        PHYSICAL EXAMINATION:    Visit Vitals  /80   Pulse 71   Resp 20   Ht 5' 3\" (1.6 m)   SpO2 97%   BMI 37.91 kg/m²     General:  Well defined, nourished, and groomed individual in no acute distress. Neck: Supple, nontender, no bruits, no pain with resistance to active range of motion. Heart: Regular rate and rhythm, no murmurs, rub, or gallop. Normal S1S2. Lungs:  Clear to auscultation bilaterally with equal chest expansion, no cough, no wheeze  Musculoskeletal:  Extremities revealed no edema and had full range of motion of joints. Psych:  Good mood and bright affect    NEUROLOGICAL EXAMINATION:     Mental Status:   Alert and oriented to person, place, and time with recent and remote memory intact. Attention span and concentration are normal. Speech is fluent with a full fund of knowledge. Cranial Nerves:  I: smell Not tested   II: visual fields Full to confrontation   II: pupils Equal, round, reactive to light   II: optic disc No papilledema   III,VII: ptosis none   III,IV,VI: extraocular muscles  Full ROM   V: mastication normal   V: facial light touch sensation  normal   VII: facial muscle function   symmetric   VIII: hearing symmetric   IX: soft palate elevation  normal   XI: trapezius strength  5/5   XI: sternocleidomastoid strength 5/5   XI: neck flexion strength  5/5   XII: tongue  midline     Motor Examination: Normal tone, bulk, and strength, 5/5 muscle strength throughout.       Sensory exam: Stocking glove sensory loss to lower extremities. Coordination:  Finger to nose and rapid arm movement testing was normal.   No resting or intention tremor    Gait and Station:  Steady while walking. Normal arm swing. No pronator drift. No muscle wasting or fasiculations noted. Reflexes:  DTRs 2+ throughout. ASSESSMENT AND PLAN    ICD-10-CM ICD-9-CM    1. Late onset Alzheimer's disease without behavioral disturbance G30.1 331.0     F02.80 294.10    2. Diabetic peripheral neuropathy (HCC) E11.42 250.60      357.2    3. Essential tremor G25.0 333.1      Late onset Alzheimer's type dementia with progression as expected. She should be encouraged to continue to remain active mentally, socially, and physically. Today, she had some increased complaints of head tremor and pain in her feet. In discussing potential treatment options, neither felt that that was necessary to simply that the issues have gotten perhaps a little bit worse. Follow-up in 6 months or sooner if needed. Jossie Lindsay

## 2020-01-28 ENCOUNTER — OFFICE VISIT (OUTPATIENT)
Dept: NEUROLOGY | Age: 70
End: 2020-01-28

## 2020-01-28 VITALS
RESPIRATION RATE: 18 BRPM | WEIGHT: 236 LBS | HEART RATE: 78 BPM | BODY MASS INDEX: 41.82 KG/M2 | SYSTOLIC BLOOD PRESSURE: 130 MMHG | DIASTOLIC BLOOD PRESSURE: 80 MMHG | HEIGHT: 63 IN | OXYGEN SATURATION: 95 %

## 2020-01-28 DIAGNOSIS — G30.1 LATE ONSET ALZHEIMER'S DISEASE WITHOUT BEHAVIORAL DISTURBANCE (HCC): Primary | ICD-10-CM

## 2020-01-28 DIAGNOSIS — F02.80 LATE ONSET ALZHEIMER'S DISEASE WITHOUT BEHAVIORAL DISTURBANCE (HCC): Primary | ICD-10-CM

## 2020-01-28 NOTE — PROGRESS NOTES
[facility] and [address] at [facility] Neurology Clinics at [address] [facility], [address] [facility] [address] [facility], [address] [place] [address]   [phone]              Chief Complaint   Patient presents with    Dementia     6 mo, spouse feels dementia is slightly worse that that of 6 mo ago     Current Outpatient Medications   Medication Sig Dispense Refill    memantine-donepeziL (NAMZARIC) 28-10 mg CSpX TAKE 1 CAPSULE DAILY 90 Cap 3    fexofenadine (ALLEGRA) 180 mg tablet Take  by mouth.  SYNTHROID 75 mcg tablet       gabapentin (NEURONTIN) 600 mg tablet Take 300 mg by mouth three (3) times daily.  triamcinolone (NASACORT AQ) 55 mcg nasal inhaler 2 Sprays daily.  JANUVIA 100 mg tablet Take 100 mg by mouth daily.  co-enzyme Q-10 (CO Q-10) 100 mg capsule Take 200 mg by mouth daily.  DOCOSAHEXANOIC ACID/EPA (FISH OIL PO) Take 1,200 mg by mouth.  cholecalciferol (VITAMIN D3) 1,000 unit tablet Take  by mouth daily.  aspirin delayed-release 81 mg tablet Take  by mouth daily.  cycle evert (CYCLE EVERT) capsule Take  by mouth. Take 1-2 capsule by mouth 1-2 hours before bedtime        No Known Allergies  Social History     Tobacco Use    Smoking status: Never Smoker    Smokeless tobacco: Never Used   Substance Use Topics    Alcohol use: No    Drug use: Not on file     Patient returns today for follow-up. She is a 22-year-old woman with dementia Alzheimer's type. She was last seen by our nurse practitioner in July 2019. At that time she was not cooking. She was independent with her other ADLs. No dangerous behaviors. She was continued on Namzaric. With    She also had some complaints of head tremor and this was in keeping with her essential tremor. Watchful waiting was enacted. Since that visit she comes today with her  and they report she continues to tolerate Namzaric. She plays games on her iPad. She is not cooking. She is not driving. No dangerous behaviors. Both of them think that her memory is a little worse.  has taken over the checkbook as she was having difficulty keeping up when she was giving money to multiple charities. No dangerous behaviors. No wandering. No hallucinations. Examination  Visit Vitals  /80 (BP 1 Location: Right arm, BP Patient Position: Sitting)   Pulse 78   Resp 18   Ht 5' 3\" (1.6 m)   Wt 107 kg (236 lb)   SpO2 95%   BMI 41.81 kg/m²     She appears appropriately dressed and properly groomed. She is interactive. No icterus. No edema. Symmetric pulses. Awake alert and oriented to January 28 or 29 and says the year is 2019 with then she corrects herself to 2024 or 2025. She calculates properly. She knows the name of the president when I tell her his first name. She has some minor aphasia in terms of trying to describe objects like she has difficulty recalling the name tablet when she talks about iPad. She says that in the news the democrats are messing up the contrary but has difficulty in explaining exactly how that is happening. She follows all commands. There is no cranial nerve deficit. No motor focality. No ataxia. Her gait is steady    Impression/Plan  Alzheimer's type dementia with continued decline. Discussed this today at length. Discussed the rationale for therapy. Questions today regarding ADLs, new medications on the horizon, expectations, staying active in all spheres etc.  Continue our current course. All questions and concerns answered. Follow-up in 6 months    Jennifer Lyles MD    Total time: 30 min   Counseling / coordination time: 20 min   > 50% counseling / coordination?: Yes re: as documented above      This note was created using voice recognition software. Despite editing, there may be syntax errors. This note will not be viewable in [address]

## 2020-01-28 NOTE — PROGRESS NOTES
Chief Complaint   Patient presents with    Dementia     6 mo, spouse feels dementia is slightly worse that that of 6 mo ago

## 2020-08-18 ENCOUNTER — OFFICE VISIT (OUTPATIENT)
Dept: NEUROLOGY | Age: 70
End: 2020-08-18
Payer: COMMERCIAL

## 2020-08-18 VITALS
DIASTOLIC BLOOD PRESSURE: 80 MMHG | WEIGHT: 236 LBS | RESPIRATION RATE: 16 BRPM | HEART RATE: 69 BPM | SYSTOLIC BLOOD PRESSURE: 100 MMHG | BODY MASS INDEX: 41.81 KG/M2 | TEMPERATURE: 97.9 F | OXYGEN SATURATION: 96 %

## 2020-08-18 DIAGNOSIS — G30.1 LATE ONSET ALZHEIMER'S DISEASE WITHOUT BEHAVIORAL DISTURBANCE (HCC): Primary | ICD-10-CM

## 2020-08-18 DIAGNOSIS — F02.80 LATE ONSET ALZHEIMER'S DISEASE WITHOUT BEHAVIORAL DISTURBANCE (HCC): Primary | ICD-10-CM

## 2020-08-18 PROCEDURE — 99214 OFFICE O/P EST MOD 30 MIN: CPT | Performed by: PSYCHIATRY & NEUROLOGY

## 2020-08-18 NOTE — LETTER
8/18/20 Patient: Reginald Solorzano YOB: 1950 Date of Visit: 8/18/2020 Nicolle Broderick MD 
[address] [facility] [address] V[address] Facsimile: [phone] Dear Nicolle Broderick MD, Thank you for referring Ms. Brandon German to [facility] for evaluation. My notes for this consultation are attached. If you have questions, please do not hesitate to call me. I look forward to following your patient along with you. Sincerely, Guanakito Orr MD

## 2020-08-18 NOTE — PROGRESS NOTES
[facility] Neurology Clinics and [address] at [facility] Neurology Clinics at [address] [facility], [address] [facility] [address] [facility], [address] [place] [address]   [phone]              Chief Complaint   Patient presents with    Dementia     slight decrease in memory and cognition     Current Outpatient Medications   Medication Sig Dispense Refill    memantine-donepeziL (NAMZARIC) 28-10 mg CSpX TAKE 1 CAPSULE DAILY 90 Cap 3    fexofenadine (ALLEGRA) 180 mg tablet Take  by mouth.  cycle evert (CYCLE EVERT) capsule Take  by mouth. Take 1-2 capsule by mouth 1-2 hours before bedtime      SYNTHROID 75 mcg tablet       gabapentin (NEURONTIN) 300 mg capsule Take 300 mg by mouth three (3) times daily.  triamcinolone (NASACORT AQ) 55 mcg nasal inhaler 2 Sprays daily.  JANUVIA 100 mg tablet Take 100 mg by mouth daily.  co-enzyme Q-10 (CO Q-10) 100 mg capsule Take 200 mg by mouth daily.  DOCOSAHEXANOIC ACID/EPA (FISH OIL PO) Take 1,200 mg by mouth.  aspirin delayed-release 81 mg tablet Take  by mouth daily.  cholecalciferol (VITAMIN D3) 1,000 unit tablet Take  by mouth daily. No Known Allergies  Social History     Tobacco Use    Smoking status: Never Smoker    Smokeless tobacco: Never Used   Substance Use Topics    Alcohol use: No    Drug use: Not on file     73-year-old woman who returns today for follow-up. Her last visit with me was January 2020. I see her for dementia the Alzheimer's type. She has been maintained on dual therapy with Namenda. Today she is here with her . They say that she is had a slight decline in her cognition. She is more forgetful in terms of misplacing things. Still doing puzzles i.e. jigsaw puzzles. No dangerous behaviors. Her  has taken over the bills. He also is supervising her medicine now. No aggression.   Sometimes she remembers things that did not happen he says or remembers things differently. Several questions today regarding progression, remembering things differently, dealing with frustration, medications and potential new medicines etc.    Examination  Visit Vitals  /80 (BP 1 Location: Left arm, BP Patient Position: Sitting)   Pulse 69   Temp 97.9 °F (36.6 °C)   Resp 16   Wt 107 kg (236 lb)   SpO2 96%   BMI 41.81 kg/m²     She looks well. She is appropriately dressed and appropriately groomed. She is overweight. She has no scleral icterus. No edema. Symmetric pulses. She is awake alert and interactive. She is oriented to March or April 2014 and she says the president is not [place]) but she does not know the name of the current president until I give her his first name. I do the same for the Democratic candidate for president and she remembers as last name when given the first.  She says in the news she has read about young people writing and tearing down statues. She follows all commands. Full versions. No nystagmus. No pronation or drift. No ataxia. Symmetric reflexes. Steady    Impression/Plan  Dementia of the Alzheimer's type with some continued decline. Answered questions as noted above today at length. Continue Namzaric. Follow in 6 months    Olga Bailey MD    Total time: 25 min   Counseling / coordination time: 15 min   > 50% counseling / coordination?: Yes re: as documented above      This note was created using voice recognition software. Despite editing, there may be syntax errors. This note will not be viewable in [address]

## 2021-02-17 ENCOUNTER — OFFICE VISIT (OUTPATIENT)
Dept: NEUROLOGY | Age: 71
End: 2021-02-17
Payer: COMMERCIAL

## 2021-02-17 VITALS
DIASTOLIC BLOOD PRESSURE: 70 MMHG | HEART RATE: 62 BPM | WEIGHT: 230 LBS | SYSTOLIC BLOOD PRESSURE: 110 MMHG | OXYGEN SATURATION: 100 % | BODY MASS INDEX: 40.74 KG/M2

## 2021-02-17 DIAGNOSIS — F02.80 LATE ONSET ALZHEIMER'S DISEASE WITHOUT BEHAVIORAL DISTURBANCE (HCC): Primary | ICD-10-CM

## 2021-02-17 DIAGNOSIS — G30.1 LATE ONSET ALZHEIMER'S DISEASE WITHOUT BEHAVIORAL DISTURBANCE (HCC): Primary | ICD-10-CM

## 2021-02-17 PROCEDURE — 99214 OFFICE O/P EST MOD 30 MIN: CPT | Performed by: PSYCHIATRY & NEUROLOGY

## 2021-02-17 RX ORDER — MEMANTINE HYDROCHLORIDE AND DONEPEZIL HYDROCHLORIDE 28; 10 MG/1; MG/1
CAPSULE ORAL
Qty: 90 CAP | Refills: 3 | Status: SHIPPED | OUTPATIENT
Start: 2021-02-17 | End: 2021-08-24 | Stop reason: SDUPTHER

## 2021-02-17 RX ORDER — ASCORBIC ACID 500 MG
500 TABLET ORAL
COMMUNITY

## 2021-02-17 RX ORDER — BISMUTH SUBSALICYLATE 262 MG
1 TABLET,CHEWABLE ORAL DAILY
COMMUNITY

## 2021-02-17 RX ORDER — DEXTROMETHORPHAN HYDROBROMIDE, GUAIFENESIN 5; 100 MG/5ML; MG/5ML
650 LIQUID ORAL EVERY 8 HOURS
COMMUNITY

## 2021-02-17 NOTE — PROGRESS NOTES
[facility] Neurology Clinics and [address] at [facility]ics at [address] [facility], [address] [facility] [address] [facility], [address] [place] [address]   [phone]              No chief complaint on file. Current Outpatient Medications   Medication Sig Dispense Refill    multivitamin (ONE A DAY) tablet Take 1 Tab by mouth daily.  ascorbic acid, vitamin C, (Vitamin C) 500 mg tablet Take 500 mg by mouth.  acetaminophen (Tylenol Arthritis Pain) 650 mg TbER Take 650 mg by mouth every eight (8) hours.  memantine-donepeziL (NAMZARIC) 28-10 mg CSpX TAKE 1 CAPSULE DAILY 90 Cap 3    cycle evert (CYCLE EVERT) capsule Take  by mouth. Take 1-2 capsule by mouth 1-2 hours before bedtime      SYNTHROID 75 mcg tablet       gabapentin (NEURONTIN) 300 mg capsule Take 300 mg by mouth three (3) times daily.  JANUVIA 100 mg tablet Take 100 mg by mouth daily.  co-enzyme Q-10 (CO Q-10) 100 mg capsule Take 200 mg by mouth daily.  DOCOSAHEXANOIC ACID/EPA (FISH OIL PO) Take 1,200 mg by mouth.  cholecalciferol (VITAMIN D3) 1,000 unit tablet Take  by mouth daily.  fexofenadine (ALLEGRA) 180 mg tablet Take  by mouth.  aspirin delayed-release 81 mg tablet Take  by mouth daily.  triamcinolone (NASACORT AQ) 55 mcg nasal inhaler 2 Sprays daily. No Known Allergies  Social History     Tobacco Use    Smoking status: Never Smoker    Smokeless tobacco: Never Used   Substance Use Topics    Alcohol use: No    Drug use: Not on file     53-year-old lady returns today for follow-up Alzheimer's type dementia. Her last visit with me was August 2020 and at that time we continued Namzaric. Her  accompanies her today. Both she and her  states she is become more forgetful. Asking the same question over and over. Misplacing things. No dangerous behaviors. She does needlepoint.   She also does some puzzles. Examination  Visit Vitals  /70   Pulse 62   Wt 104.3 kg (230 lb)   SpO2 100%   BMI 40.74 kg/m²   Pleasant well-appearing lady. Awake alert and engaging. She picks the month out of a list.  Does not know the date. Cannot tell me the year. Knows the president is not Trump. No ataxia    Impression/Plan  Alzheimer's type dementia progressing  Continue Namzaric  Continue to stay active in all spheres    Follow-up in 6 months    Tracey Graf MD          This note was created using voice recognition software. Despite editing, there may be syntax errors.

## 2021-08-24 ENCOUNTER — TELEPHONE (OUTPATIENT)
Dept: NEUROLOGY | Age: 71
End: 2021-08-24

## 2021-08-24 ENCOUNTER — OFFICE VISIT (OUTPATIENT)
Dept: NEUROLOGY | Age: 71
End: 2021-08-24
Payer: COMMERCIAL

## 2021-08-24 VITALS
HEIGHT: 63 IN | RESPIRATION RATE: 16 BRPM | WEIGHT: 235 LBS | OXYGEN SATURATION: 97 % | DIASTOLIC BLOOD PRESSURE: 62 MMHG | SYSTOLIC BLOOD PRESSURE: 128 MMHG | HEART RATE: 82 BPM | BODY MASS INDEX: 41.64 KG/M2

## 2021-08-24 DIAGNOSIS — G30.1 LATE ONSET ALZHEIMER'S DISEASE WITHOUT BEHAVIORAL DISTURBANCE (HCC): Primary | ICD-10-CM

## 2021-08-24 DIAGNOSIS — F02.80 LATE ONSET ALZHEIMER'S DISEASE WITHOUT BEHAVIORAL DISTURBANCE (HCC): Primary | ICD-10-CM

## 2021-08-24 PROCEDURE — 99214 OFFICE O/P EST MOD 30 MIN: CPT | Performed by: PSYCHIATRY & NEUROLOGY

## 2021-08-24 RX ORDER — MEMANTINE HYDROCHLORIDE AND DONEPEZIL HYDROCHLORIDE 28; 10 MG/1; MG/1
CAPSULE ORAL
Qty: 90 CAPSULE | Refills: 3 | Status: SHIPPED | OUTPATIENT
Start: 2021-08-24 | End: 2022-02-24 | Stop reason: SDUPTHER

## 2021-08-24 NOTE — PROGRESS NOTES
[facility] and [address] at [facility] Neurology Clinics at [address] [facility], [address] [facility] [address] [facility], [address] [place] [address]   [phone]              Chief Complaint   Patient presents with    Follow-up     6 mo Alzheimer's follow up      Current Outpatient Medications   Medication Sig Dispense Refill    multivitamin (ONE A DAY) tablet Take 1 Tab by mouth daily.  ascorbic acid, vitamin C, (Vitamin C) 500 mg tablet Take 500 mg by mouth.  acetaminophen (Tylenol Arthritis Pain) 650 mg TbER Take 650 mg by mouth every eight (8) hours.  memantine-donepeziL (Namzaric) 28-10 mg CSpX TAKE 1 CAPSULE DAILY 90 Cap 3    fexofenadine (ALLEGRA) 180 mg tablet Take  by mouth.  cycle evert (CYCLE EVERT) capsule Take  by mouth. Take 1-2 capsule by mouth 1-2 hours before bedtime      SYNTHROID 75 mcg tablet       gabapentin (NEURONTIN) 300 mg capsule Take 300 mg by mouth three (3) times daily.  triamcinolone (NASACORT AQ) 55 mcg nasal inhaler 2 Sprays daily.  JANUVIA 100 mg tablet Take 100 mg by mouth daily.  co-enzyme Q-10 (CO Q-10) 100 mg capsule Take 200 mg by mouth daily.  DOCOSAHEXANOIC ACID/EPA (FISH OIL PO) Take 1,200 mg by mouth.  cholecalciferol (VITAMIN D3) 1,000 unit tablet Take  by mouth daily.  aspirin delayed-release 81 mg tablet Take  by mouth daily. (Patient not taking: Reported on 8/24/2021)        No Known Allergies  Social History     Tobacco Use    Smoking status: Never Smoker    Smokeless tobacco: Never Used   Substance Use Topics    Alcohol use: No    Drug use: Not on file     63-year-old lady returns today for follow-up Alzheimer's type dementia. She is maintained on Namzaric. Today she is here with her . Her  has noticed some small decline. She has not noticed any. Namzaric. Staying busy.     Examination  Visit Vitals  Resp 16   Ht 5' 3\" (1.6 m)   Wt 106.6 kg (235 lb)   BMI 41.63 kg/m²     She looks well. She is awake and alert. She is interactive and engaging. Very conversational and pleasant. She cannot tell me the month or the date. Knows the president. Needs a hint to remember the previous president. Registration 3/3 recall at 3 minutes 1/3 and with hints 2/3. Follows all commands. No motor focality. Impression/Plan  Alzheimer's type dementia progressive  Continue Namzaric  Continue to stay active in all spheres  Good questions from her  about the newly approved [employer] drug  Request for support resources and we will connect him with the Alzheimer's Association    Follow-up in 6 months    Taty Morales MD        This note was created using voice recognition software. Despite editing, there may be syntax errors.

## 2022-02-24 ENCOUNTER — OFFICE VISIT (OUTPATIENT)
Dept: NEUROLOGY | Age: 72
End: 2022-02-24
Payer: COMMERCIAL

## 2022-02-24 VITALS
WEIGHT: 235 LBS | RESPIRATION RATE: 18 BRPM | SYSTOLIC BLOOD PRESSURE: 120 MMHG | OXYGEN SATURATION: 95 % | HEART RATE: 55 BPM | BODY MASS INDEX: 41.63 KG/M2 | TEMPERATURE: 97.6 F | DIASTOLIC BLOOD PRESSURE: 57 MMHG

## 2022-02-24 DIAGNOSIS — G30.1 LATE ONSET ALZHEIMER'S DISEASE WITHOUT BEHAVIORAL DISTURBANCE (HCC): Primary | ICD-10-CM

## 2022-02-24 DIAGNOSIS — F02.80 LATE ONSET ALZHEIMER'S DISEASE WITHOUT BEHAVIORAL DISTURBANCE (HCC): Primary | ICD-10-CM

## 2022-02-24 PROCEDURE — 99214 OFFICE O/P EST MOD 30 MIN: CPT | Performed by: PSYCHIATRY & NEUROLOGY

## 2022-02-24 RX ORDER — MEMANTINE HYDROCHLORIDE AND DONEPEZIL HYDROCHLORIDE 28; 10 MG/1; MG/1
CAPSULE ORAL
Qty: 90 CAPSULE | Refills: 3 | Status: SHIPPED | OUTPATIENT
Start: 2022-02-24 | End: 2022-08-23 | Stop reason: SDUPTHER

## 2022-02-24 NOTE — PROGRESS NOTES
[facility] Neurology Clinics and [address] at [facility] Neurology Clinics at [address] [facility], [address] [facility] [address] [facility], [address] [place] [address]   [phone]              Chief Complaint   Patient presents with    Alzheimers     no change     Current Outpatient Medications   Medication Sig Dispense Refill    OTHER prevagen      memantine-donepeziL (Namzaric) 28-10 mg CSpX TAKE 1 CAPSULE DAILY 90 Capsule 3    multivitamin (ONE A DAY) tablet Take 1 Tab by mouth daily.  ascorbic acid, vitamin C, (Vitamin C) 500 mg tablet Take 500 mg by mouth.  acetaminophen (Tylenol Arthritis Pain) 650 mg TbER Take 650 mg by mouth every eight (8) hours.  fexofenadine (ALLEGRA) 180 mg tablet Take  by mouth.  cycle evert (CYCLE EVERT) capsule Take  by mouth. Take 1-2 capsule by mouth 1-2 hours before bedtime      SYNTHROID 75 mcg tablet       gabapentin (NEURONTIN) 300 mg capsule Take 300 mg by mouth three (3) times daily.  triamcinolone (NASACORT AQ) 55 mcg nasal inhaler 2 Sprays daily.  JANUVIA 100 mg tablet Take 100 mg by mouth daily.  co-enzyme Q-10 (CO Q-10) 100 mg capsule Take 200 mg by mouth daily.  DOCOSAHEXANOIC ACID/EPA (FISH OIL PO) Take 1,200 mg by mouth.  cholecalciferol (VITAMIN D3) 1,000 unit tablet Take  by mouth daily.  aspirin delayed-release 81 mg tablet Take  by mouth daily. (Patient not taking: Reported on 8/24/2021)        No Known Allergies  Social History     Tobacco Use    Smoking status: Never Smoker    Smokeless tobacco: Never Used   Substance Use Topics    Alcohol use: No    Drug use: Not on file   29-year-old lady returns today accompanied by her  for follow-up dementia of the Alzheimer's type. She is maintained on Namzaric. Last visit was in August.  She notes that she does get frustrated by her memory at times. She is doing puzzles at home.   Stays active socially by going to [employer]. Not that physically active. Her  has noticed that she is doing less around the house. He is now doing most of the cooking now. She is asking more more the same question over and over    Examination  Visit Vitals  BP (!) 120/57 (BP 1 Location: Left upper arm, BP Patient Position: Sitting, BP Cuff Size: Large adult)   Pulse (!) 55   Temp 97.6 °F (36.4 °C)   Resp 18   Wt 106.6 kg (235 lb)   SpO2 95%   BMI 41.63 kg/m²     Pleasant lady. Well-appearing. Awake and alert. Cannot tell me the month thinking it is April. Does not know the year. Does not know the date. Cannot tell me the floor. Knows we are in [place] and she says Lenard Petty Alzheimer's type dementia  Continue to stay active in all spheres  Continue Namzaric  Follow-up 6 months    Winsome West MD        This note was created using voice recognition software. Despite editing, there may be syntax errors.

## 2022-03-18 PROBLEM — F41.9 ANXIETY: Status: ACTIVE | Noted: 2017-02-27

## 2022-03-19 PROBLEM — R41.840 ATTENTION DEFICIT: Status: ACTIVE | Noted: 2017-02-27

## 2022-03-19 PROBLEM — F32.A DEPRESSION: Status: ACTIVE | Noted: 2017-02-27

## 2022-03-19 PROBLEM — F02.80 LATE ONSET ALZHEIMER'S DISEASE WITHOUT BEHAVIORAL DISTURBANCE (HCC): Status: ACTIVE | Noted: 2017-02-27

## 2022-03-19 PROBLEM — E66.01 SEVERE OBESITY (HCC): Status: ACTIVE | Noted: 2019-01-08

## 2022-03-19 PROBLEM — G30.1 LATE ONSET ALZHEIMER'S DISEASE WITHOUT BEHAVIORAL DISTURBANCE (HCC): Status: ACTIVE | Noted: 2017-02-27

## 2022-08-23 ENCOUNTER — OFFICE VISIT (OUTPATIENT)
Dept: NEUROLOGY | Age: 72
End: 2022-08-23
Payer: COMMERCIAL

## 2022-08-23 VITALS
OXYGEN SATURATION: 96 % | HEART RATE: 57 BPM | DIASTOLIC BLOOD PRESSURE: 74 MMHG | SYSTOLIC BLOOD PRESSURE: 132 MMHG | RESPIRATION RATE: 14 BRPM

## 2022-08-23 DIAGNOSIS — G30.1 LATE ONSET ALZHEIMER'S DISEASE WITHOUT BEHAVIORAL DISTURBANCE (HCC): Primary | ICD-10-CM

## 2022-08-23 DIAGNOSIS — F02.80 LATE ONSET ALZHEIMER'S DISEASE WITHOUT BEHAVIORAL DISTURBANCE (HCC): Primary | ICD-10-CM

## 2022-08-23 PROCEDURE — 99214 OFFICE O/P EST MOD 30 MIN: CPT | Performed by: PSYCHIATRY & NEUROLOGY

## 2022-08-23 PROCEDURE — 1123F ACP DISCUSS/DSCN MKR DOCD: CPT | Performed by: PSYCHIATRY & NEUROLOGY

## 2022-08-23 RX ORDER — MEMANTINE HYDROCHLORIDE AND DONEPEZIL HYDROCHLORIDE 28; 10 MG/1; MG/1
CAPSULE ORAL
Qty: 90 CAPSULE | Refills: 3 | Status: SHIPPED | OUTPATIENT
Start: 2022-08-23

## 2022-08-23 NOTE — PROGRESS NOTES
[facility] Neurology Clinics and [address] at [facility] Neurology Clinics at [address] [facility], [address] [facility] [address] [facility], [address] [place] [address]   [phone]              Chief Complaint   Patient presents with    Alzheimers     Current Outpatient Medications   Medication Sig Dispense Refill    OTHER prevagen      memantine-donepeziL (Namzaric) 28-10 mg CSpX TAKE 1 CAPSULE DAILY 90 Capsule 3    multivitamin (ONE A DAY) tablet Take 1 Tab by mouth daily. ascorbic acid, vitamin C, (VITAMIN C) 500 mg tablet Take 500 mg by mouth. acetaminophen (TYLENOL) 650 mg TbER Take 650 mg by mouth every eight (8) hours. fexofenadine (ALLEGRA) 180 mg tablet Take  by mouth. cycle evert (CYCLE EVERT) capsule Take  by mouth. Take 1-2 capsule by mouth 1-2 hours before bedtime      SYNTHROID 75 mcg tablet       gabapentin (NEURONTIN) 300 mg capsule Take 300 mg by mouth three (3) times daily. triamcinolone (NASACORT AQ) 55 mcg nasal inhaler 2 Sprays daily. JANUVIA 100 mg tablet Take 100 mg by mouth daily. co-enzyme Q-10 (CO Q-10) 100 mg capsule Take 200 mg by mouth daily. DOCOSAHEXANOIC ACID/EPA (FISH OIL PO) Take 1,200 mg by mouth. cholecalciferol (VITAMIN D3) (1000 Units /25 mcg) tablet Take  by mouth daily. No Known Allergies  Social History     Tobacco Use    Smoking status: Never    Smokeless tobacco: Never   Substance Use Topics    Alcohol use: No     79-year-old lady returns today for follow-up Alzheimer's type dementia. Her last visit with me was in February of this year and at that time we maintained Namzaric. Today she comes with her . She thinks her memory is about the same. He does note some decline. He is taken over the finances, the cooking as well as the household chores. She is sleeping more. Not doing as much coloring and reading and doing puzzles.   Tolerating Namzaric. Examination  Visit Vitals  /74 (BP 1 Location: Right arm, BP Patient Position: Sitting, BP Cuff Size: Large adult)   Pulse (!) 57   Resp 14   SpO2 96%     Pleasant well-appearing lady. She is awake alert and interactive. She knows its August says it is around the middle of the month and saurabh said around the 15th. She does not know the year. She knows she came up the elevator but she is unsure of what floor wrong. She knows were in [place]. Continue the ability to spell the word house forward but cannot spell it backwards. Difficulty with calculating the number of quarters in a dollar and $0.75. Follows all commands. No ataxia. Impression/Plan  Progressive Alzheimer's type dementia  Discussed trying to stay more active with perhaps playing some games on the computer or playing cards or checkers versus just television  Continue Namzaric  Discussed this is usual progression  Good questions today regarding potential upcoming therapies and hopefully we may know something more at their next visit in 6 months    Gertrude Hernandez MD        This note was created using voice recognition software. Despite editing, there may be syntax errors.

## 2023-02-02 ENCOUNTER — TELEPHONE (OUTPATIENT)
Dept: NEUROLOGY | Age: 73
End: 2023-02-02

## 2023-02-02 NOTE — TELEPHONE ENCOUNTER
Pt son calling bc father passed away suddenly. Needs to discuss mothers care. Infored him to come to [facility] and fill out hippa, but still requesting call back.

## 2023-02-03 NOTE — TELEPHONE ENCOUNTER
We do not have active PHI form and the last one that was  does not have his name on it.   As much as I sympathize with their situation, I am not able to call back w/o active PHI

## 2023-02-15 ENCOUNTER — OFFICE VISIT (OUTPATIENT)
Dept: NEUROLOGY | Age: 73
End: 2023-02-15
Payer: COMMERCIAL

## 2023-02-15 VITALS
TEMPERATURE: 97.5 F | OXYGEN SATURATION: 97 % | HEART RATE: 87 BPM | DIASTOLIC BLOOD PRESSURE: 82 MMHG | SYSTOLIC BLOOD PRESSURE: 128 MMHG | RESPIRATION RATE: 20 BRPM

## 2023-02-15 DIAGNOSIS — F02.80 LATE ONSET ALZHEIMER'S DISEASE WITHOUT BEHAVIORAL DISTURBANCE (HCC): Primary | ICD-10-CM

## 2023-02-15 DIAGNOSIS — G30.1 LATE ONSET ALZHEIMER'S DISEASE WITHOUT BEHAVIORAL DISTURBANCE (HCC): Primary | ICD-10-CM

## 2023-02-15 PROCEDURE — 1123F ACP DISCUSS/DSCN MKR DOCD: CPT

## 2023-02-15 PROCEDURE — 99214 OFFICE O/P EST MOD 30 MIN: CPT

## 2023-02-15 NOTE — PROGRESS NOTES
Pill Hero machine that dispenses her medication- need an order   Up to a couple of hours a day in home nursing   Letter stating a diagnosis of alzheimers

## 2023-02-15 NOTE — PROGRESS NOTES
Stephanie Malhotra is a 81 y.o. female who presents with the following  Chief Complaint   Patient presents with    Follow-up     AD       HPI  Patient is here today with her son-in-law for Alzheimer's follow-up. Patient was last seen August 23, 2022 by Dr. Franko Mcallister. At the last visit patient's  had noted some decline in the patient's memory however the patient felt that things were the same.  stated that he is now doing the finances, cooking and household chores. He stated that the patient is sleeping more and not doing as much mental activities as she used to. Patient is on Namzaric 28-10 mg daily and tolerates this well. Today the patient and her son-in-law states that the patient's  passed away last month and at this point, the patient is living on her own. Her children have placed cameras in her home and they are trying to work with their insurance company to get all of the help that they can get for her to be able to stay on her own for a while with the ultimate goal of her being placed in a facility somewhere close to [place] where her son lives. Patient is asking for an order for a pill hero machine that dispenses medications at certain times. He states that they call the patient in the morning and in the evening when it is time for her to take her medications to remind her. Patient's son-in-law is also asking for an order for home health which insurance states that they will pay for 2 hours a day. He also wanted something that states her diagnosis. Patient's son in law states that she is somewhat anxious in the evening time now that she is alone. She is having instances where she is not eating and they have to call and remind her to. Her daughter comes and cleans the home 1 time a week. The patient does have 2 cats and a dog that she is caring for. No hallucinations, delusions or wandering. Is able to do most of her ADLs on her own including cooking simple meals. She does not drive. Today the patient was able to tell me that she was at a doctor's office but was unsure of the county, the floor we are on, that it takes three quarters to make $0.75. She was not able to tell me the month, year, season, or how many grandchildren she has. She does not do much to keep her brain active. No Known Allergies    Current Outpatient Medications   Medication Sig    OTHER Pill hero machine    memantine-donepeziL (Namzaric) 28-10 mg CSpX TAKE 1 CAPSULE DAILY    OTHER prevagen    multivitamin (ONE A DAY) tablet Take 1 Tab by mouth daily. ascorbic acid, vitamin C, (VITAMIN C) 500 mg tablet Take 500 mg by mouth. acetaminophen (TYLENOL) 650 mg TbER Take 650 mg by mouth every eight (8) hours. fexofenadine (ALLEGRA) 180 mg tablet Take  by mouth. cycle evert (CYCLE EVERT) capsule Take  by mouth. Take 1-2 capsule by mouth 1-2 hours before bedtime    SYNTHROID 75 mcg tablet     gabapentin (NEURONTIN) 300 mg capsule Take 300 mg by mouth three (3) times daily. triamcinolone (NASACORT AQ) 55 mcg nasal inhaler 2 Sprays daily. JANUVIA 100 mg tablet Take 100 mg by mouth daily. co-enzyme Q-10 (CO Q-10) 100 mg capsule Take 200 mg by mouth daily. DOCOSAHEXANOIC ACID/EPA (FISH OIL PO) Take 1,200 mg by mouth. cholecalciferol (VITAMIN D3) (1000 Units /25 mcg) tablet Take  by mouth daily. No current facility-administered medications for this visit.        Social History     Tobacco Use   Smoking Status Never   Smokeless Tobacco Never       Past Medical History:   Diagnosis Date    Diabetes ([address]     Gastrointestinal disorder     Joint pain     Muscle pain     Neuropathy     Obesity        Past Surgical History:   Procedure Laterality Date    COLONOSCOPY      ENDOSCOPY VISIT-OUTPATIENT      HX CHOLECYSTECTOMY      HX GYN      HX HYSTERECTOMY         Family History   Problem Relation Age of Onset    Stroke Mother     Stroke Father     Dementia Other     Heart Disease Other Neuropathy Other        Social History     Socioeconomic History    Marital status:    Tobacco Use    Smoking status: Never    Smokeless tobacco: Never   Substance and Sexual Activity    Alcohol use: No       Review of Systems   Constitutional: Negative. Musculoskeletal:  Negative for falls. Psychiatric/Behavioral:  Positive for memory loss. The patient is nervous/anxious. Remainder of comprehensive review is negative. Physical Exam :    Visit Vitals  /82 (BP 1 Location: Right upper arm, BP Patient Position: Sitting, BP Cuff Size: Adult)   Pulse 87   Temp 97.5 °F (36.4 °C)   Resp 20   SpO2 97%       General: Well defined, nourished, and groomed individual in no acute distress. Neck: Supple, nontender, no bruits, no pain with resistance to active range of motion. Musculoskeletal: Extremities revealed no edema and had full range of motion of joints. Psych: Good mood and bright affect    NEUROLOGICAL EXAMINATION:    Mental Status: Alert and oriented to person and place only. Cranial Nerves:    II, III, IV, VI: Visual acuity grossly intact. Visual fields are normal.    Pupils are equal, round, and reactive to light and accommodation. Extra-ocular movements are full and fluid. Fundoscopic exam was benign, no ptosis or nystagmus. V-XII: Hearing is grossly intact. Facial features are symmetric, with normal sensation and strength. The palate rises symmetrically and the tongue protrudes midline. Sternocleidomastoids 5/5. Motor Examination: Normal tone, bulk, and strength, 5/5 muscle strength throughout. Coordination: Finger to nose was normal. No resting or intention tremor    Gait and Station: Steady while walking. Normal arm swing. No pronator drift. No muscle wasting or fasiculations noted. Reflexes: DTRs 2+ throughout.         Results for orders placed or performed during the hospital encounter of 09/20/16   POC CREATININE   Result Value Ref Range    Creatinine (POC) 1.0 0.6 - 1.3 MG/DL    GFRAA, POC >60 >60 ml/min/1.73m2    GFRNA, POC 56 (L) >60 ml/min/1.73m2       Orders Placed This Encounter    REFERRAL TO HOME HEALTH     Referral Priority:   Routine     Referral Type:   Home Health Evaluation     Referral Reason:   Continuity of Care     Number of Visits Requested:   1    OTHER     Sig: Pill hero machine     Dispense:  1 Device     Refill:  0       1. Late onset Alzheimer's disease without behavioral disturbance ([facility] [address]    Continue taking Namzaric 28-10 mg daily for memory. Printed an order for the pill hero machine. Refer the patient to [address] [place] [address]. Advised the patient that we are not allowed to order aids and he understands this. He states that [employer] will pay for 2 hours of an LPN every day. Urged the patient to try to be as mentally and physically active as possible. Printed Dr. Viv Morales neuropsych evaluation results for the patient to take with him for proof of diagnosis. Follow-up in 6 months or sooner if needed.           This note will not be viewable in [employer]